# Patient Record
Sex: MALE | Race: WHITE | Employment: FULL TIME | URBAN - METROPOLITAN AREA
[De-identification: names, ages, dates, MRNs, and addresses within clinical notes are randomized per-mention and may not be internally consistent; named-entity substitution may affect disease eponyms.]

---

## 2009-08-11 LAB
CHOLESTEROL, TOTAL: 229 MG/DL
CHOLESTEROL/HDL RATIO: NORMAL
HDLC SERPL-MCNC: 42 MG/DL (ref 35–70)
LDL CHOLESTEROL CALCULATED: 143 MG/DL (ref 0–160)
TRIGL SERPL-MCNC: 219 MG/DL
VLDLC SERPL CALC-MCNC: 44 MG/DL

## 2017-03-31 LAB
BUN BLDV-MCNC: 14 MG/DL
CALCIUM SERPL-MCNC: 10.3 MG/DL
CHLORIDE BLD-SCNC: 101 MMOL/L
CO2: 26 MMOL/L
CREAT SERPL-MCNC: 1 MG/DL
GFR CALCULATED: NORMAL
GLUCOSE BLD-MCNC: 84 MG/DL
POTASSIUM SERPL-SCNC: 4.4 MMOL/L
SODIUM BLD-SCNC: 142 MMOL/L

## 2019-04-24 DIAGNOSIS — N52.9 IMPOTENCE OF ORGANIC ORIGIN: ICD-10-CM

## 2019-04-24 DIAGNOSIS — E78.5 HYPERLIPIDEMIA, UNSPECIFIED HYPERLIPIDEMIA TYPE: ICD-10-CM

## 2019-04-24 RX ORDER — LISINOPRIL AND HYDROCHLOROTHIAZIDE 12.5; 1 MG/1; MG/1
1 TABLET ORAL DAILY
COMMUNITY
End: 2019-05-17 | Stop reason: SDUPTHER

## 2019-04-24 RX ORDER — TADALAFIL 20 MG/1
20 TABLET ORAL PRN
COMMUNITY
End: 2019-05-17

## 2019-05-17 ENCOUNTER — OFFICE VISIT (OUTPATIENT)
Dept: FAMILY MEDICINE CLINIC | Age: 59
End: 2019-05-17
Payer: COMMERCIAL

## 2019-05-17 VITALS
DIASTOLIC BLOOD PRESSURE: 80 MMHG | HEIGHT: 70 IN | SYSTOLIC BLOOD PRESSURE: 130 MMHG | BODY MASS INDEX: 31.47 KG/M2 | HEART RATE: 76 BPM | WEIGHT: 219.8 LBS

## 2019-05-17 DIAGNOSIS — Z13.220 SCREENING FOR HYPERLIPIDEMIA: ICD-10-CM

## 2019-05-17 DIAGNOSIS — Z12.11 SCREENING FOR COLON CANCER: ICD-10-CM

## 2019-05-17 DIAGNOSIS — N52.9 IMPOTENCE OF ORGANIC ORIGIN: ICD-10-CM

## 2019-05-17 DIAGNOSIS — E78.5 HYPERLIPIDEMIA, UNSPECIFIED HYPERLIPIDEMIA TYPE: Primary | ICD-10-CM

## 2019-05-17 DIAGNOSIS — I10 ESSENTIAL HYPERTENSION: ICD-10-CM

## 2019-05-17 DIAGNOSIS — Z23 NEED FOR PROPHYLACTIC VACCINATION AGAINST DIPHTHERIA-TETANUS-PERTUSSIS (DTP): ICD-10-CM

## 2019-05-17 PROCEDURE — 99213 OFFICE O/P EST LOW 20 MIN: CPT | Performed by: FAMILY MEDICINE

## 2019-05-17 RX ORDER — LISINOPRIL AND HYDROCHLOROTHIAZIDE 12.5; 1 MG/1; MG/1
1 TABLET ORAL DAILY
Qty: 30 TABLET | Refills: 5 | Status: SHIPPED | OUTPATIENT
Start: 2019-05-17 | End: 2019-11-21 | Stop reason: SDUPTHER

## 2019-05-17 RX ORDER — TADALAFIL 20 MG/1
20 TABLET ORAL DAILY PRN
Qty: 10 TABLET | Refills: 5 | Status: SHIPPED | OUTPATIENT
Start: 2019-05-17 | End: 2019-11-21 | Stop reason: SDUPTHER

## 2019-05-17 ASSESSMENT — PATIENT HEALTH QUESTIONNAIRE - PHQ9
1. LITTLE INTEREST OR PLEASURE IN DOING THINGS: 0
SUM OF ALL RESPONSES TO PHQ QUESTIONS 1-9: 0
SUM OF ALL RESPONSES TO PHQ QUESTIONS 1-9: 0
SUM OF ALL RESPONSES TO PHQ9 QUESTIONS 1 & 2: 0
2. FEELING DOWN, DEPRESSED OR HOPELESS: 0

## 2019-05-17 ASSESSMENT — ENCOUNTER SYMPTOMS
SHORTNESS OF BREATH: 0
CHEST TIGHTNESS: 0
ABDOMINAL PAIN: 0
WHEEZING: 0
COUGH: 0
RESPIRATORY NEGATIVE: 1

## 2019-05-17 NOTE — PROGRESS NOTES
5/17/2019     Hussein Nolen is a 64 y.o. male who presents today with   Chief Complaint   Patient presents with    Medication Refill     ([facility])    6 Month Follow-Up     0 COMPLAINTS       HPI    Here for follow-up of multiple medical problems including hypertension. He needs updated lipid profile and blood sugar in the chemistry panel. He considers general health. He has not yet been screened for colon cancer and opts for colon regard. He due for Td At the vaccine with back ordered today. 20.  Get shingrix vaccine off-site  . He suffers erectile dysfunction and I talked to her about looking for generic Cialis      REVIEW OF SYMPTOMS    Review of Systems   Constitutional: Negative. Negative for activity change, appetite change and unexpected weight change. Respiratory: Negative. Negative for cough, chest tightness, shortness of breath and wheezing. Cardiovascular: Negative. Negative for chest pain and leg swelling. Gastrointestinal: Negative for abdominal pain. Genitourinary:        Erectile dysfunction   Psychiatric/Behavioral: Negative.         PAST MEDICAL HISTORY  Past Medical History:   Diagnosis Date    Hyperlipidemia     Impotence of organic origin        FAMILY HISTORY  Family History   Problem Relation Age of Onset    Other Mother        SOCIAL HISTORY  Social History     Socioeconomic History    Marital status:      Spouse name: None    Number of children: None    Years of education: None    Highest education level: None   Occupational History    None   Social Needs    Financial resource strain: None    Food insecurity:     Worry: None     Inability: None    Transportation needs:     Medical: None     Non-medical: None   Tobacco Use    Smoking status: Never Smoker    Smokeless tobacco: Never Used   Substance and Sexual Activity    Alcohol use: Yes     Comment: SOCIAL    Drug use: Never    Sexual activity: None   Lifestyle    Physical activity: Days per week: None     Minutes per session: None    Stress: None   Relationships    Social connections:     Talks on phone: None     Gets together: None     Attends Christianity service: None     Active member of club or organization: None     Attends meetings of clubs or organizations: None     Relationship status: None    Intimate partner violence:     Fear of current or ex partner: None     Emotionally abused: None     Physically abused: None     Forced sexual activity: None   Other Topics Concern    None   Social History Narrative    None        SURGICAL HISTORY  History reviewed. No pertinent surgical history. CURRENT MEDICATIONS  Current Outpatient Medications   Medication Sig Dispense Refill    lisinopril-hydrochlorothiazide (PRINZIDE;ZESTORETIC) 10-12.5 MG per tablet Take 1 tablet by mouth daily 30 tablet 5    tadalafil (CIALIS) 20 MG tablet Take 1 tablet by mouth daily as needed for Erectile Dysfunction 10 tablet 5     No current facility-administered medications for this visit. ALLERGIES  No Known Allergies    PHYSICAL EXAM    /80 (Site: Left Upper Arm, Position: Sitting, Cuff Size: Medium Adult)   Pulse 76   Ht 5' 10\" (1.778 m)   Wt 219 lb 12.8 oz (99.7 kg)   BMI 31.54 kg/m²     Physical Exam   Constitutional: He is oriented to person, place, and time. He appears well-developed and well-nourished. HENT:   Nose: Nose normal.   Mouth/Throat: Oropharynx is clear and moist.   Eyes: Conjunctivae are normal.   Neck: Neck supple. No thyromegaly present. Cardiovascular: Normal rate, regular rhythm, normal heart sounds and intact distal pulses. Pulmonary/Chest: Effort normal and breath sounds normal. No respiratory distress. He has no wheezes. He has no rales. Abdominal: Soft. There is no tenderness. Musculoskeletal: He exhibits no edema. Lymphadenopathy:     He has no cervical adenopathy. Neurological: He is alert and oriented to person, place, and time.    Skin: Skin is warm and dry. Psychiatric: He has a normal mood and affect. Nursing note and vitals reviewed. ASSESSMENT:    Latonia Smith was seen today for medication refill and 6 month follow-up. Diagnoses and all orders for this visit:    Hyperlipidemia, unspecified hyperlipidemia type    Essential hypertension  -     Cancel: Basic Metabolic Panel; Future  -     Comprehensive Metabolic Panel; Future  -     Lipid Panel; Future    Screening for colon cancer  -     COLOGUARD; Future    Screening for hyperlipidemia    Need for prophylactic vaccination against diphtheria-tetanus-pertussis (DTP)    Impotence of organic origin    Other orders  -     lisinopril-hydrochlorothiazide (PRINZIDE;ZESTORETIC) 10-12.5 MG per tablet; Take 1 tablet by mouth daily  -     Zoster recombinant (SHINGRIX)  -     tadalafil (CIALIS) 20 MG tablet; Take 1 tablet by mouth daily as needed for Erectile Dysfunction     Medically stable. Needs updated immunizations and lab in colon cancer screening       PLAN:  Colon regard. Get a chemistry, lipid profile. I gave him a written prescription for generic Cialis. Follow-up in 6 months          Return in about 6 months (around 11/17/2019). Electronically signed by Priscilla Bales MD on 5/17/2019    Please note that this chart was generated using dragon dictation software. Although every effort was made to ensure the accuracy of this automated transcription, some errors in transcription may have occurred.

## 2019-06-08 ENCOUNTER — HOSPITAL ENCOUNTER (OUTPATIENT)
Age: 59
Discharge: HOME | End: 2019-06-08
Payer: COMMERCIAL

## 2019-06-08 DIAGNOSIS — I10 ESSENTIAL HYPERTENSION: ICD-10-CM

## 2019-06-08 LAB
ALBUMIN SERPL-MCNC: 4.3 GM/DL (ref 3.4–5)
ALP BLD-CCNC: 60 IU/L (ref 40–128)
ALT SERPL-CCNC: 31 U/L (ref 10–40)
ANION GAP SERPL CALCULATED.3IONS-SCNC: 11 MMOL/L (ref 4–16)
AST SERPL-CCNC: 18 IU/L (ref 15–37)
BILIRUB SERPL-MCNC: 0.4 MG/DL (ref 0–1)
BUN BLDV-MCNC: 17 MG/DL (ref 6–23)
CALCIUM SERPL-MCNC: 9.3 MG/DL (ref 8.3–10.6)
CHLORIDE BLD-SCNC: 103 MMOL/L (ref 99–110)
CHOLESTEROL: 214 MG/DL
CO2: 25 MMOL/L (ref 21–32)
CREAT SERPL-MCNC: 1.1 MG/DL (ref 0.9–1.3)
GFR AFRICAN AMERICAN: >60 ML/MIN/1.73M2
GFR NON-AFRICAN AMERICAN: >60 ML/MIN/1.73M2
GLUCOSE BLD-MCNC: 97 MG/DL (ref 70–99)
HDLC SERPL-MCNC: 41 MG/DL
LDL CHOLESTEROL DIRECT: 174 MG/DL
POTASSIUM SERPL-SCNC: 4 MMOL/L (ref 3.5–5.1)
SODIUM BLD-SCNC: 139 MMOL/L (ref 135–145)
TOTAL PROTEIN: 7 GM/DL (ref 6.4–8.2)
TRIGL SERPL-MCNC: 132 MG/DL

## 2019-06-08 PROCEDURE — 83721 ASSAY OF BLOOD LIPOPROTEIN: CPT

## 2019-06-08 PROCEDURE — 36415 COLL VENOUS BLD VENIPUNCTURE: CPT

## 2019-06-08 PROCEDURE — 80053 COMPREHEN METABOLIC PANEL: CPT

## 2019-06-08 PROCEDURE — 80061 LIPID PANEL: CPT

## 2019-11-21 ENCOUNTER — OFFICE VISIT (OUTPATIENT)
Dept: FAMILY MEDICINE CLINIC | Age: 59
End: 2019-11-21
Payer: COMMERCIAL

## 2019-11-21 VITALS
HEIGHT: 70 IN | DIASTOLIC BLOOD PRESSURE: 88 MMHG | BODY MASS INDEX: 32.67 KG/M2 | HEART RATE: 73 BPM | SYSTOLIC BLOOD PRESSURE: 120 MMHG | WEIGHT: 228.2 LBS

## 2019-11-21 DIAGNOSIS — N52.9 IMPOTENCE OF ORGANIC ORIGIN: ICD-10-CM

## 2019-11-21 DIAGNOSIS — E78.2 MIXED HYPERLIPIDEMIA: ICD-10-CM

## 2019-11-21 DIAGNOSIS — I10 ESSENTIAL HYPERTENSION: ICD-10-CM

## 2019-11-21 DIAGNOSIS — Z23 NEED FOR PROPHYLACTIC VACCINATION AGAINST DIPHTHERIA-TETANUS-PERTUSSIS (DTP): ICD-10-CM

## 2019-11-21 DIAGNOSIS — Z23 NEED FOR PROPHYLACTIC VACCINATION AND INOCULATION AGAINST VARICELLA: ICD-10-CM

## 2019-11-21 DIAGNOSIS — Z23 NEEDS FLU SHOT: Primary | ICD-10-CM

## 2019-11-21 DIAGNOSIS — Z12.11 SCREENING FOR COLON CANCER: ICD-10-CM

## 2019-11-21 PROCEDURE — 99213 OFFICE O/P EST LOW 20 MIN: CPT | Performed by: FAMILY MEDICINE

## 2019-11-21 RX ORDER — ATORVASTATIN CALCIUM 40 MG/1
40 TABLET, FILM COATED ORAL DAILY
Qty: 90 TABLET | Refills: 1 | Status: SHIPPED | OUTPATIENT
Start: 2019-11-21 | End: 2020-05-21 | Stop reason: SDUPTHER

## 2019-11-21 RX ORDER — LISINOPRIL AND HYDROCHLOROTHIAZIDE 12.5; 1 MG/1; MG/1
1 TABLET ORAL DAILY
Qty: 30 TABLET | Refills: 5 | Status: SHIPPED | OUTPATIENT
Start: 2019-11-21 | End: 2020-05-21 | Stop reason: SDUPTHER

## 2019-11-21 RX ORDER — TADALAFIL 20 MG/1
20 TABLET ORAL DAILY PRN
Qty: 30 TABLET | Refills: 5 | Status: SHIPPED | OUTPATIENT
Start: 2019-11-21 | End: 2019-11-21 | Stop reason: SDUPTHER

## 2019-11-21 RX ORDER — TADALAFIL 20 MG/1
20 TABLET ORAL DAILY PRN
Qty: 30 TABLET | Refills: 5 | Status: SHIPPED | OUTPATIENT
Start: 2019-11-21 | End: 2020-05-21 | Stop reason: SDUPTHER

## 2019-11-21 ASSESSMENT — ENCOUNTER SYMPTOMS
COUGH: 0
ABDOMINAL PAIN: 0
WHEEZING: 0
RESPIRATORY NEGATIVE: 1
CHEST TIGHTNESS: 0
SHORTNESS OF BREATH: 0

## 2020-05-21 ENCOUNTER — TELEMEDICINE (OUTPATIENT)
Dept: FAMILY MEDICINE CLINIC | Age: 60
End: 2020-05-21
Payer: COMMERCIAL

## 2020-05-21 VITALS
SYSTOLIC BLOOD PRESSURE: 122 MMHG | HEART RATE: 85 BPM | WEIGHT: 228 LBS | HEIGHT: 70 IN | DIASTOLIC BLOOD PRESSURE: 82 MMHG | BODY MASS INDEX: 32.64 KG/M2 | TEMPERATURE: 97.4 F

## 2020-05-21 PROCEDURE — 99213 OFFICE O/P EST LOW 20 MIN: CPT | Performed by: FAMILY MEDICINE

## 2020-05-21 RX ORDER — ATORVASTATIN CALCIUM 40 MG/1
40 TABLET, FILM COATED ORAL DAILY
Qty: 90 TABLET | Refills: 1 | Status: SHIPPED | OUTPATIENT
Start: 2020-05-21 | End: 2020-11-23 | Stop reason: SDUPTHER

## 2020-05-21 RX ORDER — LISINOPRIL AND HYDROCHLOROTHIAZIDE 12.5; 1 MG/1; MG/1
1 TABLET ORAL DAILY
Qty: 90 TABLET | Refills: 1 | Status: SHIPPED | OUTPATIENT
Start: 2020-05-21 | End: 2020-11-23 | Stop reason: SDUPTHER

## 2020-05-21 RX ORDER — TADALAFIL 20 MG/1
20 TABLET ORAL DAILY PRN
Qty: 30 TABLET | Refills: 5 | Status: SHIPPED | OUTPATIENT
Start: 2020-05-21 | End: 2020-11-23 | Stop reason: SDUPTHER

## 2020-05-21 ASSESSMENT — PATIENT HEALTH QUESTIONNAIRE - PHQ9
SUM OF ALL RESPONSES TO PHQ9 QUESTIONS 1 & 2: 0
1. LITTLE INTEREST OR PLEASURE IN DOING THINGS: 0
2. FEELING DOWN, DEPRESSED OR HOPELESS: 0
SUM OF ALL RESPONSES TO PHQ QUESTIONS 1-9: 0
SUM OF ALL RESPONSES TO PHQ QUESTIONS 1-9: 0

## 2020-05-21 ASSESSMENT — ENCOUNTER SYMPTOMS
SORE THROAT: 0
SHORTNESS OF BREATH: 0
COUGH: 0
RHINORRHEA: 0

## 2020-05-21 NOTE — PROGRESS NOTES
2020    TELEHEALTH EVALUATION -- Audio/Visual (During INKVX-98 public health emergency)    HPI:    Cheryl Stevens (:  1960) has requested an audio/video evaluation for the following concern(s):  Initial visit done today for routine follow-up of his usual problems    Review of Systems   Constitutional: Positive for activity change. Negative for fever and unexpected weight change. Less active due to Covid 19 restrictions   HENT: Negative for congestion, rhinorrhea and sore throat. Respiratory: Negative for cough and shortness of breath. Cardiovascular: Negative for chest pain. Gastrointestinal:        The patient has no GI complaints  I had referred him for Cologuard testing he said he got the kit and send it back to the lab but we never got the report I could not find it in the scanned to media or in his lab file  Regard to contact Cologuard to get the report   Genitourinary:        Takes generic Cialis as needed   Neurological: Negative. Psychiatric/Behavioral: Negative. Prior to Visit Medications    Medication Sig Taking?  Authorizing Provider   lisinopril-hydroCHLOROthiazide (PRINZIDE;ZESTORETIC) 10-12.5 MG per tablet Take 1 tablet by mouth daily Yes Keena Posey MD   atorvastatin (LIPITOR) 40 MG tablet Take 1 tablet by mouth daily Yes Keena Posey MD   tadalafil (CIALIS) 20 MG tablet Take 1 tablet by mouth daily as needed for Erectile Dysfunction Yes Keena Posey MD       Social History     Tobacco Use    Smoking status: Never Smoker    Smokeless tobacco: Never Used   Substance Use Topics    Alcohol use: Yes     Comment: SOCIAL    Drug use: Never            PHYSICAL EXAMINATION:  [ INSTRUCTIONS:  \"[x]\" Indicates a positive item  \"[]\" Indicates a negative item  -- DELETE ALL ITEMS NOT EXAMINED]  Vital Signs: (As obtained by patient/caregiver or practitioner observation)  Wt 228  Blood pressure-122/82  Heart rate-85   Respiratory rate-    Temperature- Pulse oximetry-     Constitutional: [x] Appears well-developed and well-nourished [x] No apparent distress      [] Abnormal-   Mental status  [x] Alert and awake  [x] Oriented to person/place/time [x]Able to follow commands      Eyes:  EOM    []  Normal  [] Abnormal-  Sclera  []  Normal  [] Abnormal -         Discharge []  None visible  [] Abnormal -    HENT:   [x] Normocephalic, atraumatic. [] Abnormal   [] Mouth/Throat: Mucous membranes are moist.     External Ears [x] Normal  [] Abnormal-     Neck: [x] No visualized mass     Pulmonary/Chest: [x] Respiratory effort normal.  [x] No visualized signs of difficulty breathing or respiratory distress        [] Abnormal-      Musculoskeletal:   [] Normal gait with no signs of ataxia         [] Normal range of motion of neck        [] Abnormal-       Neurological:        [x] No Facial Asymmetry (Cranial nerve 7 motor function) (limited exam to video visit)          [] No gaze palsy        [] Abnormal-         Skin:        [x] No significant exanthematous lesions or discoloration noted on facial skin         [] Abnormal-            Psychiatric:       [x] Normal Affect [] No Hallucinations        [] Abnormal-     Other pertinent observable physical exam findings-   Viewed his immunization status and his most recent labs ASSESSMENT/PLAN:  1. Essential hypertension  Same treatment    2. Hyperlipidemia, unspecified hyperlipidemia type  Same treatment    3. Impotence of organic origin  Cialis PRN    We will contact Sonia to check on his report    Talia Kovacs is a 64 y.o. male being evaluated by a Virtual Visit (video visit) encounter to address concerns as mentioned above. A caregiver was present when appropriate. Due to this being a TeleHealth encounter (During NDSKX-63 public health emergency), evaluation of the following organ systems was limited: Vitals/Constitutional/EENT/Resp/CV/GI//MS/Neuro/Skin/Heme-Lymph-Imm.   Pursuant to the emergency declaration under the

## 2020-09-29 ENCOUNTER — TELEPHONE (OUTPATIENT)
Dept: FAMILY MEDICINE CLINIC | Age: 60
End: 2020-09-29

## 2020-09-29 NOTE — TELEPHONE ENCOUNTER
Dx with covid last week and still running a temp coughing so much he is gaging and has dry heaves. Sinus drainage making him cough so much!!     Can you call in something for the cough and drainage? ?     Please let him know

## 2020-10-02 ENCOUNTER — TELEPHONE (OUTPATIENT)
Dept: FAMILY MEDICINE CLINIC | Age: 60
End: 2020-10-02

## 2020-10-02 NOTE — TELEPHONE ENCOUNTER
Typically are having pt's use OTC treatments. He can use Mucinex DM which would help with calming his cough, breaking up the mucous and provide a bit of decongestant. Just have him monitor his blood pressure, as this type of medication can raise it a little.

## 2020-10-02 NOTE — TELEPHONE ENCOUNTER
To Dr. Kimberley Mcbride     Patient tested positive for COVID--wants to know if anything can be sent in for the ongoing coughing.

## 2020-10-05 RX ORDER — DEXTROMETHORPHAN HYDROBROMIDE, GUAIFENESIN AND PSEUDOEPHEDRINE HYDROCHLORIDE 15; 400; 60 MG/1; MG/1; MG/1
1 TABLET ORAL 4 TIMES DAILY PRN
Qty: 56 TABLET | Refills: 1 | Status: SHIPPED | OUTPATIENT
Start: 2020-10-05 | End: 2020-11-23

## 2020-11-23 ENCOUNTER — TELEMEDICINE (OUTPATIENT)
Dept: FAMILY MEDICINE CLINIC | Age: 60
End: 2020-11-23
Payer: COMMERCIAL

## 2020-11-23 PROCEDURE — 99214 OFFICE O/P EST MOD 30 MIN: CPT | Performed by: FAMILY MEDICINE

## 2020-11-23 RX ORDER — ATORVASTATIN CALCIUM 40 MG/1
40 TABLET, FILM COATED ORAL DAILY
Qty: 30 TABLET | Refills: 5 | Status: SHIPPED | OUTPATIENT
Start: 2020-11-23 | End: 2021-05-25 | Stop reason: SDUPTHER

## 2020-11-23 RX ORDER — TADALAFIL 20 MG/1
20 TABLET ORAL DAILY PRN
Qty: 30 TABLET | Refills: 5 | Status: SHIPPED | OUTPATIENT
Start: 2020-11-23 | End: 2021-12-13 | Stop reason: ALTCHOICE

## 2020-11-23 RX ORDER — LISINOPRIL AND HYDROCHLOROTHIAZIDE 12.5; 1 MG/1; MG/1
1 TABLET ORAL DAILY
Qty: 30 TABLET | Refills: 5 | Status: SHIPPED | OUTPATIENT
Start: 2020-11-23 | End: 2021-05-25 | Stop reason: SDUPTHER

## 2020-11-23 ASSESSMENT — ENCOUNTER SYMPTOMS
SHORTNESS OF BREATH: 0
SORE THROAT: 0
COUGH: 0

## 2020-11-23 NOTE — PROGRESS NOTES
well-nourished [x] No apparent distress      [] Abnormal-   Mental status  [x] Alert and awake  [x] Oriented to person/place/time [x]Able to follow commands      Eyes:  EOM    []  Normal  [] Abnormal-  Sclera  []  Normal  [] Abnormal -         Discharge []  None visible  [] Abnormal -    HENT:   [x] Normocephalic, atraumatic. [] Abnormal   [] Mouth/Throat: Mucous membranes are moist.     External Ears [x] Normal  [] Abnormal-     Neck: [x] No visualized mass     Pulmonary/Chest: [x] Respiratory effort normal.  [x] No visualized signs of difficulty breathing or respiratory distress        [] Abnormal-      Musculoskeletal:   [] Normal gait with no signs of ataxia         [] Normal range of motion of neck        [] Abnormal-       Neurological:        [x] No Facial Asymmetry (Cranial nerve 7 motor function) (limited exam to video visit)          [] No gaze palsy        [] Abnormal-         Skin:        [x] No significant exanthematous lesions or discoloration noted on facial skin         [] Abnormal-            Psychiatric:       [x] Normal Affect [] No Hallucinations        [] Abnormal-     Other pertinent observable physical exam findings-     ASSESSMENT/PLAN:  1. Impotence of organic origin      2. Hyperlipidemia, unspecified hyperlipidemia type      3. Essential hypertension    Recent COVID-19 infection completely resolved    Return in about 6 months (around 5/23/2021). Zita Leventhal is a 64 y.o. male being evaluated by a Virtual Visit (video visit) encounter to address concerns as mentioned above. A caregiver was present when appropriate. Due to this being a TeleHealth encounter (During PKWWT-46 public health emergency), evaluation of the following organ systems was limited: Vitals/Constitutional/EENT/Resp/CV/GI//MS/Neuro/Skin/Heme-Lymph-Imm.   Pursuant to the emergency declaration under the [facility] [place], [address] waiver authority and the [employer] Supplemental Appropriations Act, this Virtual Visit was conducted with patient's (and/or legal guardian's) consent, to reduce the patient's risk of exposure to COVID-19 and provide necessary medical care. The patient (and/or legal guardian) has also been advised to contact this office for worsening conditions or problems, and seek emergency medical treatment and/or call 911 if deemed necessary. Patient identification was verified at the start of the visit: Yes    Total time spent on this encounter: Not billed by time    Services were provided through a video synchronous discussion virtually to substitute for in-person clinic visit. Patient and provider were located at their individual homes. --Demetris Cuba MD on 11/23/2020 at 5:31 PM    An electronic signature was used to authenticate this note.

## 2021-05-25 ENCOUNTER — TELEMEDICINE (OUTPATIENT)
Dept: FAMILY MEDICINE CLINIC | Age: 61
End: 2021-05-25
Payer: COMMERCIAL

## 2021-05-25 DIAGNOSIS — E78.5 HYPERLIPIDEMIA, UNSPECIFIED HYPERLIPIDEMIA TYPE: ICD-10-CM

## 2021-05-25 DIAGNOSIS — N52.9 IMPOTENCE OF ORGANIC ORIGIN: ICD-10-CM

## 2021-05-25 DIAGNOSIS — I10 ESSENTIAL HYPERTENSION: Primary | ICD-10-CM

## 2021-05-25 PROCEDURE — 99213 OFFICE O/P EST LOW 20 MIN: CPT | Performed by: FAMILY MEDICINE

## 2021-05-25 RX ORDER — ATORVASTATIN CALCIUM 40 MG/1
40 TABLET, FILM COATED ORAL DAILY
Qty: 30 TABLET | Refills: 5 | Status: SHIPPED | OUTPATIENT
Start: 2021-05-25 | End: 2021-12-03 | Stop reason: SDUPTHER

## 2021-05-25 RX ORDER — LISINOPRIL AND HYDROCHLOROTHIAZIDE 12.5; 1 MG/1; MG/1
1 TABLET ORAL DAILY
Qty: 30 TABLET | Refills: 5 | Status: SHIPPED | OUTPATIENT
Start: 2021-05-25 | End: 2021-12-03 | Stop reason: SDUPTHER

## 2021-05-25 ASSESSMENT — PATIENT HEALTH QUESTIONNAIRE - PHQ9
SUM OF ALL RESPONSES TO PHQ QUESTIONS 1-9: 0
SUM OF ALL RESPONSES TO PHQ QUESTIONS 1-9: 0
SUM OF ALL RESPONSES TO PHQ9 QUESTIONS 1 & 2: 0

## 2021-05-25 NOTE — PROGRESS NOTES
2021    TELEHEALTH EVALUATION -- Audio/Visual (During MCJII-40 public health emergency)    HPI:    Elise Nevarez (:  1960) has requested an audio/video evaluation for the following concern(s):    Virtual follow-up  Patient doing well he said no change in his condition he is tolerating his current medicines    Review of Systems   Constitutional: Negative. Cardiovascular:        Hypertension hyperlipidemia well controlled on medication and diet no history or symptoms of heart disease   Gastrointestinal:        Completed and negative negative Cologuard about a year ago   Genitourinary:        As needed Viagra for erectile dysfunction       Prior to Visit Medications    Medication Sig Taking?  Authorizing Provider   atorvastatin (LIPITOR) 40 MG tablet Take 1 tablet by mouth daily Yes Maribel Daugherty MD   lisinopril-hydroCHLOROthiazide (PRINZIDE;ZESTORETIC) 10-12.5 MG per tablet Take 1 tablet by mouth daily Yes Maribel Daugherty MD   tadalafil (CIALIS) 20 MG tablet Take 1 tablet by mouth daily as needed for Erectile Dysfunction Yes Maribel Daugherty MD       Social History     Tobacco Use    Smoking status: Never Smoker    Smokeless tobacco: Never Used   Substance Use Topics    Alcohol use: Yes     Comment: SOCIAL    Drug use: Never            PHYSICAL EXAMINATION:  [ INSTRUCTIONS:  \"[x]\" Indicates a positive item  \"[]\" Indicates a negative item  -- DELETE ALL ITEMS NOT EXAMINED]  Vital Signs: (As obtained by patient/caregiver or practitioner observation)    Blood pressure-  Heart rate-    Respiratory rate-    Temperature-  Pulse oximetry-     Constitutional: [x] Appears well-developed and well-nourished [] No apparent distress      [] Abnormal-   Mental status  [x] Alert and awake  [] Oriented to person/place/time []Able to follow commands      Eyes:  EOM    []  Normal  [] Abnormal-  Sclera  []  Normal  [] Abnormal -         Discharge []  None visible  [] Abnormal -    HENT:   [] Normocephalic, atraumatic. [] Abnormal   [] Mouth/Throat: Mucous membranes are moist.     External Ears [] Normal  [] Abnormal-     Neck: [] No visualized mass     Pulmonary/Chest: [x] Respiratory effort normal.  [] No visualized signs of difficulty breathing or respiratory distress        [] Abnormal-      Musculoskeletal:   [] Normal gait with no signs of ataxia         [] Normal range of motion of neck        [] Abnormal-       Neurological:        [] No Facial Asymmetry (Cranial nerve 7 motor function) (limited exam to video visit)          [] No gaze palsy        [] Abnormal-         Skin:        [] No significant exanthematous lesions or discoloration noted on facial skin         [] Abnormal-            Psychiatric:       [] Normal Affect [] No Hallucinations        [] Abnormal-     Other pertinent observable physical exam findings-   Reviewed immunizations  Has had Covid vaccination  Reviewed most recent labs  Reviewed Cologuard    ASSESSMENT/PLAN:  1. Essential hypertension      2. Hyperlipidemia, unspecified hyperlipidemia type      3. Impotence of organic origin    Same treatments  Come in the next week get a fasting chemistry lipid profile  Follow-up in 6 months  Return in about 6 months (around 11/25/2021). Casie Jim, was evaluated through a synchronous (real-time) audio-video encounter. The patient (or guardian if applicable) is aware that this is a billable service. Verbal consent to proceed has been obtained within the past 12 months. The visit was conducted pursuant to the emergency declaration under the [facility], [address] [place] [address] authority and the [employer] General Act. Patient identification was verified, and a caregiver was present when appropriate. The patient was located in a state where the provider was credentialed to provide care.     Total time spent on this encounter: Not billed by time    --Jason Dior MD on 5/25/2021 at 4:09 PM    An electronic signature was used to authenticate this note.

## 2021-12-03 RX ORDER — ATORVASTATIN CALCIUM 40 MG/1
40 TABLET, FILM COATED ORAL DAILY
Qty: 30 TABLET | Refills: 5 | Status: SHIPPED | OUTPATIENT
Start: 2021-12-03 | End: 2022-06-09 | Stop reason: SDUPTHER

## 2021-12-03 RX ORDER — LISINOPRIL AND HYDROCHLOROTHIAZIDE 12.5; 1 MG/1; MG/1
1 TABLET ORAL DAILY
Qty: 30 TABLET | Refills: 5 | Status: SHIPPED | OUTPATIENT
Start: 2021-12-03 | End: 2022-06-09 | Stop reason: SDUPTHER

## 2021-12-13 ENCOUNTER — OFFICE VISIT (OUTPATIENT)
Dept: FAMILY MEDICINE CLINIC | Age: 61
End: 2021-12-13
Payer: COMMERCIAL

## 2021-12-13 VITALS
HEART RATE: 92 BPM | HEIGHT: 70 IN | OXYGEN SATURATION: 96 % | WEIGHT: 235 LBS | DIASTOLIC BLOOD PRESSURE: 84 MMHG | SYSTOLIC BLOOD PRESSURE: 118 MMHG | BODY MASS INDEX: 33.64 KG/M2

## 2021-12-13 DIAGNOSIS — E78.5 HYPERLIPIDEMIA, UNSPECIFIED HYPERLIPIDEMIA TYPE: Primary | ICD-10-CM

## 2021-12-13 DIAGNOSIS — R58 ECCHYMOSIS: Chronic | ICD-10-CM

## 2021-12-13 DIAGNOSIS — I10 ESSENTIAL HYPERTENSION: ICD-10-CM

## 2021-12-13 DIAGNOSIS — E78.5 HYPERLIPIDEMIA, UNSPECIFIED HYPERLIPIDEMIA TYPE: ICD-10-CM

## 2021-12-13 LAB
A/G RATIO: 1.7 (ref 1.1–2.2)
ALBUMIN SERPL-MCNC: 4.7 G/DL (ref 3.4–5)
ALP BLD-CCNC: 82 U/L (ref 40–129)
ALT SERPL-CCNC: 30 U/L (ref 10–40)
ANION GAP SERPL CALCULATED.3IONS-SCNC: 15 MMOL/L (ref 3–16)
AST SERPL-CCNC: 24 U/L (ref 15–37)
BASOPHILS ABSOLUTE: 0 K/UL (ref 0–0.2)
BASOPHILS RELATIVE PERCENT: 0.1 %
BILIRUB SERPL-MCNC: 0.6 MG/DL (ref 0–1)
BUN BLDV-MCNC: 12 MG/DL (ref 7–20)
CALCIUM SERPL-MCNC: 9.2 MG/DL (ref 8.3–10.6)
CHLORIDE BLD-SCNC: 99 MMOL/L (ref 99–110)
CHOLESTEROL, TOTAL: 175 MG/DL (ref 0–199)
CO2: 23 MMOL/L (ref 21–32)
CREAT SERPL-MCNC: 1 MG/DL (ref 0.9–1.3)
EOSINOPHILS ABSOLUTE: 0 K/UL (ref 0–0.6)
EOSINOPHILS RELATIVE PERCENT: 0.3 %
GFR AFRICAN AMERICAN: >60
GFR NON-AFRICAN AMERICAN: >60
GLUCOSE BLD-MCNC: 95 MG/DL (ref 70–99)
HCT VFR BLD CALC: 42 % (ref 40.5–52.5)
HDLC SERPL-MCNC: 33 MG/DL (ref 40–60)
HEMATOLOGY PATH CONSULT: YES
HEMOGLOBIN: 14.5 G/DL (ref 13.5–17.5)
INR BLD: 1.02 (ref 0.88–1.12)
LDL CHOLESTEROL CALCULATED: 104 MG/DL
LYMPHOCYTES ABSOLUTE: 0.7 K/UL (ref 1–5.1)
LYMPHOCYTES RELATIVE PERCENT: 41.8 %
MCH RBC QN AUTO: 27.9 PG (ref 26–34)
MCHC RBC AUTO-ENTMCNC: 34.4 G/DL (ref 31–36)
MCV RBC AUTO: 81 FL (ref 80–100)
MONOCYTES ABSOLUTE: 0.4 K/UL (ref 0–1.3)
MONOCYTES RELATIVE PERCENT: 23.6 %
NEUTROPHILS ABSOLUTE: 0.5 K/UL (ref 1.7–7.7)
NEUTROPHILS RELATIVE PERCENT: 34.2 %
PDW BLD-RTO: 13.9 % (ref 12.4–15.4)
PLATELET # BLD: 13 K/UL (ref 135–450)
PLATELET SLIDE REVIEW: ABNORMAL
PMV BLD AUTO: 10.1 FL (ref 5–10.5)
POTASSIUM SERPL-SCNC: 3.7 MMOL/L (ref 3.5–5.1)
PROTHROMBIN TIME: 11.5 SEC (ref 9.9–12.7)
RBC # BLD: 5.19 M/UL (ref 4.2–5.9)
SLIDE REVIEW: ABNORMAL
SODIUM BLD-SCNC: 137 MMOL/L (ref 136–145)
TOTAL PROTEIN: 7.4 G/DL (ref 6.4–8.2)
TRIGL SERPL-MCNC: 190 MG/DL (ref 0–150)
VLDLC SERPL CALC-MCNC: 38 MG/DL
WBC # BLD: 1.6 K/UL (ref 4–11)

## 2021-12-13 PROCEDURE — 99213 OFFICE O/P EST LOW 20 MIN: CPT | Performed by: FAMILY MEDICINE

## 2021-12-13 NOTE — PROGRESS NOTES
2021     Diana Prima      Chief Complaint   Patient presents with    6 Month Follow-Up    Bleeding/Bruising     pt reports bruising and small cuts, pt reports difficulty getting the bleeding to stop, pt reports concerns for anemia, noticed about x1 week after covid vaccines       HPI      Nara Kirby is a 63 y.o. male who presents today with the followin. Hyperlipidemia, unspecified hyperlipidemia type    2. Ecchymosis    3.  Essential hypertension      Is here for 6-month follow-up  He is concerned about increased bruising tendency and has had a couple nosebleeds  He is worried about having a blood disorder he dates this back to when he had Covid immunizations about 6 months ago  REVIEW OF SYMPTOMS    Review of Systems   Cardiovascular:        Hypertension and hyperlipidemia well-controlled   Gastrointestinal:        Did a Cologuard last year which was normal will be due again in 2 years   Genitourinary:        Discussed pros and cons of prostate screening with PSA the patient declines screening       PAST MEDICAL HISTORY  Past Medical History:   Diagnosis Date    Hyperlipidemia     Impotence of organic origin        FAMILY HISTORY  Family History   Problem Relation Age of Onset    Other Mother        SOCIAL HISTORY  Social History     Socioeconomic History    Marital status:      Spouse name: None    Number of children: None    Years of education: None    Highest education level: None   Occupational History    None   Tobacco Use    Smoking status: Never Smoker    Smokeless tobacco: Never Used   Substance and Sexual Activity    Alcohol use: Yes     Comment: SOCIAL    Drug use: Never    Sexual activity: None   Other Topics Concern    None   Social History Narrative    None     Social Determinants of Health     Financial Resource Strain:     Difficulty of Paying Living Expenses: Not on file   Food Insecurity:     Worried About Running Out of Food in the Last Year: Not on file    Ran Out of Food in the Last Year: Not on file   Transportation Needs:     Lack of Transportation (Medical): Not on file    Lack of Transportation (Non-Medical): Not on file   Physical Activity:     Days of Exercise per Week: Not on file    Minutes of Exercise per Session: Not on file   Stress:     Feeling of Stress : Not on file   Social Connections:     Frequency of Communication with Friends and Family: Not on file    Frequency of Social Gatherings with Friends and Family: Not on file    Attends Yazidi Services: Not on file    Active Member of [address] [place] [address] Goalbook or Organizations: Not on file    Attends Club or Organization Meetings: Not on file    Marital Status: Not on file   Intimate Partner Violence:     Fear of Current or Ex-Partner: Not on file    Emotionally Abused: Not on file    Physically Abused: Not on file    Sexually Abused: Not on file   Housing Stability:     Unable to Pay for Housing in the Last Year: Not on file    Number of [place] in the Last Year: Not on file    Unstable Housing in the Last Year: Not on file        SURGICAL HISTORY  No past surgical history on file. CURRENT MEDICATIONS  Current Outpatient Medications   Medication Sig Dispense Refill    lisinopril-hydroCHLOROthiazide (PRINZIDE;ZESTORETIC) 10-12.5 MG per tablet Take 1 tablet by mouth daily 30 tablet 5    atorvastatin (LIPITOR) 40 MG tablet Take 1 tablet by mouth daily 30 tablet 5     No current facility-administered medications for this visit. ALLERGIES  No Known Allergies    PHYSICAL EXAM    /84   Pulse 92   Ht 5' 10\" (1.778 m)   Wt 235 lb (106.6 kg)   SpO2 96%   BMI 33.72 kg/m²     Physical Exam  Vitals and nursing note reviewed. Constitutional:       Appearance: Normal appearance. Cardiovascular:      Rate and Rhythm: Normal rate.    Pulmonary:      Effort: Pulmonary effort is normal.     Reviewed immunizations  Reviewed most recent labs        ASSESSMENT and PLAN    Arnulfo Mace was seen today for 6 month follow-up and bleeding/bruising. Diagnoses and all orders for this visit:    Hyperlipidemia, unspecified hyperlipidemia type  -     Comprehensive Metabolic Panel; Future  -     Lipid Panel; Future    Ecchymosis  -     CBC Auto Differential; Future  -     Protime-INR; Future    Essential hypertension    Check labs including a pro time and a CBC  Check lipids and chemistry profile  Follow-up in 6 months  Follow-up by phone regarding his test results    Return in about 6 months (around 6/13/2022). Electronically signed by Dori Wolfe MD on 12/13/2021    Please note that this chart was generated using dragon dictation software. Although every effort was made to ensure the accuracy of this automated transcription, some errors in transcription may have occurred.

## 2021-12-14 ENCOUNTER — TELEPHONE (OUTPATIENT)
Dept: FAMILY MEDICINE CLINIC | Age: 61
End: 2021-12-14

## 2021-12-14 DIAGNOSIS — D69.6 THROMBOCYTOPENIA (HCC): Primary | ICD-10-CM

## 2021-12-14 LAB — HEMATOLOGY PATH CONSULT: NORMAL

## 2022-01-18 NOTE — PROGRESS NOTES
Patient Name:  Paul Randhawa  Patient :  1960  Patient MRN:  [id number]     Primary Oncologist: Daniel Dahl MD  Referring Provider: King Ely MD     Date of Service: 2022      Reason for Consult: Leukopenia and thrombocytopenia     Chief Complaint:  No chief complaint on file. Patient Active Problem List:     Hyperlipidemia     Impotence of organic origin     Essential hypertension     Ecchymosis       HPI:   Lennox Dingwall is a pleasant 81-year-old male patient who was referred for evaluation of leukopenia and thrombocytopenia. In 2015 WBC was 9.93, hemoglobin 14.1, platelet 452. On 2021 WBC was 1.6, hemoglobin 14.5, hematocrit 42, MCV 81, platelet 13. CMP was grossly unremarkable. Peripheral blood smear and histogram are reviewed.  Leukopenia is  present with absolute neutropenia.  Granulocytes display occasional hypogranularity.  Definitive circulating blasts are not identified. There is no evidence of anemia and red blood cells display normal morphology.  Platelets are decreased without evidence of clumping. Clinical correlation is recommended.  If no etiology is identified for the patient's cytopenias, hematology consultation should be considered with possible bone marrow biopsy to exclude myeloid disorder/neoplasm. He denied any recent infection. He denies any epistaxis or gums bleed. He also denied any excessive bleeding. He had vaccine for COVID-19 in April and May 2021. After the vaccine he had quite often easy bruising. Also he reported that he had blood in the tissue when he blow his nose intermittently after the vaccine. He is aware about his heart murmur which he has for more than 10 years. I discussed with him the work-up further leukopenia/thrombocytopenia. I doubt that he has TTP.   I will order CBC with manual differential, flow cytometry, work-up for thrombocytopenia including ultrasound of the abdomen to rule out hepatosplenomegaly. We also discussed about potential bone marrow study depending on the test result. I reviewed his medication list.  He denied taking any herbal medication. No tonic water. He drinks 4-5 beers weekly. Cologuard in 2021 was negative. Past Medical History:   Hyperlipidemia   Hypertension  Impotence of organic origin      Past Surgery History:    No history of colonoscopy. Denied any surgical procedure. Social History:   He denied any smoking history. He drinks 4-5 beers weekly. He has 2 children. Family History:   No malignancy in the family. No Known Allergies    Current Outpatient Medications on File Prior to Visit   Medication Sig Dispense Refill    lisinopril-hydroCHLOROthiazide (PRINZIDE;ZESTORETIC) 10-12.5 MG per tablet Take 1 tablet by mouth daily 30 tablet 5    atorvastatin (LIPITOR) 40 MG tablet Take 1 tablet by mouth daily 30 tablet 5     No current facility-administered medications on file prior to visit. Review of Systems:    Constitutional:  No weight loss, No fever, No chills, No night sweats. Energy level good. Eyes:  No diplopia, No transient or permanent loss of vision, No scotomata. ENT / Mouth:  No epistaxis, No dysphagia, No hoarseness, No oral ulcers, No gingival bleeding. No sore throat, No postnasal drip, No nasal drip, No mouth pain, No sinus pain, No tinnitus, Normal hearing. Cardiovascular:  No chest pain, No palpitations, No syncope, No upper extremity edema, No lower extremity edema, No calf discomfort. Respiratory:  No cough. No hemoptysis, No pleurisy, No wheezing, No dyspnea. Gastrointestinal:  No abdominal pain, No abdominal cramping, No nausea, No vomiting, No constipation, No diarrhea, No hematochezia, No melena, No jaundice, No dyspepsia, No dysphagia. Urinary:  No dysuria, No hematuria, No urinary incontinence. Musculoskeletal:  No muscle pain, No swollen joints, No joint redness, No bone pain, No spine tenderness.   Skin:  No rash, No nodules, No pruritus, No lesions. Neurologic:  No confusion, No seizures, No syncope, No tremor, No speech change, No headache, No hiccups, No abnormal gait, No sensory changes, No weakness. Psychiatric:  No depression, No anxiety, Concentration normal.  Endocrine:  No polyuria, No polydipsia, No hot flashes, No thyroid symptoms. Hematologic:  No epistaxis, No gingival bleeding, No petechiae, No ecchymosis. Lymphatic:  No lymphadenopathy, No lymphedema. Allergy / Immunologic:  No eczema, No frequent mucous infections, No frequent respiratory infections, No recurrent urticarial, No frequent skin infections. Vital Signs: There were no vitals taken for this visit. Physical Exam:  CONSTITUTIONAL: awake, alert, cooperative, no apparent distress   EYES: pupils equal, round and reactive to light, sclera clear and conjunctiva normal  ENT: Normocephalic, without obvious abnormality, atraumatic  NECK: supple, symmetrical, no jugular venous distension and no carotid bruits   HEMATOLOGIC/LYMPHATIC: no cervical, supraclavicular or axillary lymphadenopathy   LUNGS: no increased work of breathing and clear to auscultation   CARDIOVASCULAR: regular rate and rhythm, normal S1 and S2, + murmur  ABDOMEN: normal bowel sound, soft, non-distended, non-tender, no masses palpated, no hepatosplenomegaly   MUSCULOSKELETAL: full range of motion noted, tone is normal  NEUROLOGIC: awake, alert, oriented to name, place and time. Motor skills grossly intact. SKIN: Normal skin color, texture, turgor and no jaundice.  appears intact   EXTREMITIES: no LE edema     Labs:  Hematology:  Lab Results   Component Value Date    WBC 1.6 (LL) 12/13/2021    RBC 5.19 12/13/2021    HGB 14.5 12/13/2021    HCT 42.0 12/13/2021    MCV 81.0 12/13/2021    MCH 27.9 12/13/2021    MCHC 34.4 12/13/2021    RDW 13.9 12/13/2021    PLT 13 (LL) 12/13/2021    MPV 10.1 12/13/2021    EOSRELPCT 0.3 12/13/2021    BASOPCT 0.1 12/13/2021    LYMPHOPCT 41.8

## 2022-01-21 ENCOUNTER — INITIAL CONSULT (OUTPATIENT)
Dept: ONCOLOGY | Age: 62
End: 2022-01-21
Payer: COMMERCIAL

## 2022-01-21 ENCOUNTER — HOSPITAL ENCOUNTER (OUTPATIENT)
Dept: INFUSION THERAPY | Age: 62
Discharge: HOME | End: 2022-01-21
Payer: COMMERCIAL

## 2022-01-21 VITALS
BODY MASS INDEX: 33.21 KG/M2 | RESPIRATION RATE: 18 BRPM | HEART RATE: 111 BPM | HEIGHT: 70 IN | TEMPERATURE: 98.3 F | DIASTOLIC BLOOD PRESSURE: 85 MMHG | OXYGEN SATURATION: 97 % | WEIGHT: 232 LBS | SYSTOLIC BLOOD PRESSURE: 143 MMHG

## 2022-01-21 DIAGNOSIS — D69.6 THROMBOCYTOPENIA (HCC): ICD-10-CM

## 2022-01-21 DIAGNOSIS — D72.819 LEUKOPENIA, UNSPECIFIED TYPE: ICD-10-CM

## 2022-01-21 DIAGNOSIS — D69.6 THROMBOCYTOPENIA (HCC): Primary | ICD-10-CM

## 2022-01-21 LAB
ALBUMIN SERPL-MCNC: 4.3 GM/DL (ref 3.4–5)
ALP BLD-CCNC: 90 IU/L (ref 40–129)
ALT SERPL-CCNC: 28 U/L (ref 10–40)
ANION GAP SERPL CALCULATED.3IONS-SCNC: 14 MMOL/L (ref 4–16)
AST SERPL-CCNC: 20 IU/L (ref 15–37)
BASOPHILS ABSOLUTE: 0 K/CU MM
BASOPHILS RELATIVE PERCENT: 0 % (ref 0–1)
BILIRUB SERPL-MCNC: 0.4 MG/DL (ref 0–1)
BUN BLDV-MCNC: 15 MG/DL (ref 6–23)
CALCIUM SERPL-MCNC: 9 MG/DL (ref 8.3–10.6)
CHLORIDE BLD-SCNC: 101 MMOL/L (ref 99–110)
CO2: 22 MMOL/L (ref 21–32)
CREAT SERPL-MCNC: 1 MG/DL (ref 0.9–1.3)
DIFFERENTIAL TYPE: ABNORMAL
EOSINOPHILS ABSOLUTE: 0 K/CU MM
EOSINOPHILS RELATIVE PERCENT: 0 % (ref 0–3)
FOLATE: 10.8 NG/ML (ref 3.1–17.5)
GFR AFRICAN AMERICAN: >60 ML/MIN/1.73M2
GFR NON-AFRICAN AMERICAN: >60 ML/MIN/1.73M2
GLUCOSE BLD-MCNC: 122 MG/DL (ref 70–99)
HAV IGM SER IA-ACNC: NON REACTIVE
HCT VFR BLD CALC: 43.5 % (ref 42–52)
HEMOGLOBIN: 15.4 GM/DL (ref 13.5–18)
HEPATITIS B CORE IGM ANTIBODY: NON REACTIVE
HEPATITIS C ANTIBODY: NON REACTIVE
LACTATE DEHYDROGENASE: 202 IU/L (ref 120–246)
LYMPHOCYTES ABSOLUTE: 0.7 K/CU MM
LYMPHOCYTES RELATIVE PERCENT: 36.3 % (ref 24–44)
MCH RBC QN AUTO: 27.5 PG (ref 27–31)
MCHC RBC AUTO-ENTMCNC: 35.4 % (ref 32–36)
MCV RBC AUTO: 77.7 FL (ref 78–100)
MONOCYTES ABSOLUTE: 0.4 K/CU MM
MONOCYTES RELATIVE PERCENT: 20 % (ref 0–4)
PDW BLD-RTO: 13.7 % (ref 11.7–14.9)
PLATELET # BLD: 28 K/CU MM (ref 140–440)
PMV BLD AUTO: 12.3 FL (ref 7.5–11.1)
POTASSIUM SERPL-SCNC: 3.7 MMOL/L (ref 3.5–5.1)
RBC # BLD: 5.6 M/CU MM (ref 4.6–6.2)
SEGMENTED NEUTROPHILS ABSOLUTE COUNT: 0.8 K/CU MM
SEGMENTED NEUTROPHILS RELATIVE PERCENT: 43.7 % (ref 36–66)
SODIUM BLD-SCNC: 137 MMOL/L (ref 135–145)
TOTAL PROTEIN: 7.7 GM/DL (ref 6.4–8.2)
VITAMIN B-12: 1027 PG/ML (ref 211–911)
WBC # BLD: 1.9 K/CU MM (ref 4–10.5)

## 2022-01-21 PROCEDURE — 86803 HEPATITIS C AB TEST: CPT

## 2022-01-21 PROCEDURE — 86430 RHEUMATOID FACTOR TEST QUAL: CPT

## 2022-01-21 PROCEDURE — 99204 OFFICE O/P NEW MOD 45 MIN: CPT | Performed by: INTERNAL MEDICINE

## 2022-01-21 PROCEDURE — 86705 HEP B CORE ANTIBODY IGM: CPT

## 2022-01-21 PROCEDURE — 87389 HIV-1 AG W/HIV-1&-2 AB AG IA: CPT

## 2022-01-21 PROCEDURE — 85025 COMPLETE CBC W/AUTO DIFF WBC: CPT

## 2022-01-21 PROCEDURE — 82607 VITAMIN B-12: CPT

## 2022-01-21 PROCEDURE — 82746 ASSAY OF FOLIC ACID SERUM: CPT

## 2022-01-21 PROCEDURE — 36415 COLL VENOUS BLD VENIPUNCTURE: CPT

## 2022-01-21 PROCEDURE — 80053 COMPREHEN METABOLIC PANEL: CPT

## 2022-01-21 PROCEDURE — 86707 HEPATITIS BE ANTIBODY: CPT

## 2022-01-21 PROCEDURE — 87350 HEPATITIS BE AG IA: CPT

## 2022-01-21 PROCEDURE — 83615 LACTATE (LD) (LDH) ENZYME: CPT

## 2022-01-21 PROCEDURE — 86038 ANTINUCLEAR ANTIBODIES: CPT

## 2022-01-21 PROCEDURE — 86709 HEPATITIS A IGM ANTIBODY: CPT

## 2022-01-21 RX ORDER — LORAZEPAM 0.5 MG/1
0.5 TABLET ORAL DAILY PRN
Qty: 2 TABLET | Refills: 0 | Status: SHIPPED | OUTPATIENT
Start: 2022-01-21 | End: 2022-02-07

## 2022-01-21 ASSESSMENT — PATIENT HEALTH QUESTIONNAIRE - PHQ9
2. FEELING DOWN, DEPRESSED OR HOPELESS: 0
SUM OF ALL RESPONSES TO PHQ9 QUESTIONS 1 & 2: 0
1. LITTLE INTEREST OR PLEASURE IN DOING THINGS: 0
SUM OF ALL RESPONSES TO PHQ QUESTIONS 1-9: 0

## 2022-01-21 NOTE — PROGRESS NOTES
MA Rooming Questions  Patient: Kayla Drake  MRN: [id number]    Date: 1/21/2022        NP      5. Did the patient have a depression screening completed today?  Yes    PHQ-9 Total Score: 0 (1/21/2022  2:55 PM)       PHQ-9 Given to (if applicable):               PHQ-9 Score (if applicable):                     [] Positive     [x]  Negative              Does question #9 need addressed (if applicable)                     [] Yes    []  No               Rowdy Fabian MA

## 2022-01-22 LAB — HIV SCREEN: NON REACTIVE

## 2022-01-23 LAB — HEPATITIS BE ANTIBODY: NEGATIVE

## 2022-01-24 LAB
ANTI-NUCLEAR ANTIBODY (ANA): NORMAL
RHEUMATOID FACTOR: <10 IU/ML (ref 0–14)

## 2022-01-25 ENCOUNTER — PROCEDURE VISIT (OUTPATIENT)
Dept: ONCOLOGY | Age: 62
End: 2022-01-25
Payer: COMMERCIAL

## 2022-01-25 ENCOUNTER — HOSPITAL ENCOUNTER (OUTPATIENT)
Dept: INFUSION THERAPY | Age: 62
Discharge: HOME | End: 2022-01-25
Payer: COMMERCIAL

## 2022-01-25 VITALS
SYSTOLIC BLOOD PRESSURE: 133 MMHG | HEIGHT: 70 IN | TEMPERATURE: 98.1 F | OXYGEN SATURATION: 99 % | DIASTOLIC BLOOD PRESSURE: 83 MMHG | WEIGHT: 236 LBS | HEART RATE: 90 BPM | BODY MASS INDEX: 33.79 KG/M2 | RESPIRATION RATE: 18 BRPM

## 2022-01-25 DIAGNOSIS — D72.819 LEUKOPENIA, UNSPECIFIED TYPE: Primary | ICD-10-CM

## 2022-01-25 DIAGNOSIS — D72.819 LEUKOPENIA, UNSPECIFIED TYPE: ICD-10-CM

## 2022-01-25 LAB
BASOPHILS ABSOLUTE: 0 K/CU MM
BASOPHILS RELATIVE PERCENT: 0.5 % (ref 0–1)
DIFFERENTIAL TYPE: ABNORMAL
EOSINOPHILS ABSOLUTE: 0 K/CU MM
EOSINOPHILS RELATIVE PERCENT: 0 % (ref 0–3)
HCT VFR BLD CALC: 40.9 % (ref 42–52)
HEMOGLOBIN: 14.3 GM/DL (ref 13.5–18)
HEPATITIS BE ANTIGEN: NEGATIVE
LYMPHOCYTES ABSOLUTE: 0.8 K/CU MM
LYMPHOCYTES RELATIVE PERCENT: 42.1 % (ref 24–44)
MCH RBC QN AUTO: 27.9 PG (ref 27–31)
MCHC RBC AUTO-ENTMCNC: 35 % (ref 32–36)
MCV RBC AUTO: 79.9 FL (ref 78–100)
MONOCYTES ABSOLUTE: 0.4 K/CU MM
MONOCYTES RELATIVE PERCENT: 22.1 % (ref 0–4)
PDW BLD-RTO: 13.6 % (ref 11.7–14.9)
PLATELET # BLD: 28 K/CU MM (ref 140–440)
PMV BLD AUTO: 12.6 FL (ref 7.5–11.1)
RBC # BLD: 5.12 M/CU MM (ref 4.6–6.2)
RETICULOCYTE COUNT PCT: 1.8 % (ref 0.2–2.2)
SEGMENTED NEUTROPHILS ABSOLUTE COUNT: 0.7 K/CU MM
SEGMENTED NEUTROPHILS RELATIVE PERCENT: 35.3 % (ref 36–66)
WBC # BLD: 2 K/CU MM (ref 4–10.5)

## 2022-01-25 PROCEDURE — 38220 DX BONE MARROW ASPIRATIONS: CPT | Performed by: INTERNAL MEDICINE

## 2022-01-25 PROCEDURE — 88311 DECALCIFY TISSUE: CPT | Performed by: PATHOLOGY

## 2022-01-25 PROCEDURE — 85045 AUTOMATED RETICULOCYTE COUNT: CPT

## 2022-01-25 PROCEDURE — 88313 SPECIAL STAINS GROUP 2: CPT | Performed by: PATHOLOGY

## 2022-01-25 PROCEDURE — 85025 COMPLETE CBC W/AUTO DIFF WBC: CPT

## 2022-01-25 PROCEDURE — 36415 COLL VENOUS BLD VENIPUNCTURE: CPT

## 2022-01-25 PROCEDURE — 88305 TISSUE EXAM BY PATHOLOGIST: CPT | Performed by: PATHOLOGY

## 2022-01-25 PROCEDURE — 88305 TISSUE EXAM BY PATHOLOGIST: CPT

## 2022-01-25 NOTE — PROGRESS NOTES
Marly Ferrera  1960  [id number]      Primary Oncologist: Kristin Sanchez MD  Referring Provider: Dr Ignacio Shafer    1/25/2022     BONE MARROW BIOPSY & ASPIRATION    Consent:  Marly Ferrera voiced an understanding of the risks/benefits and the answers to their questions, then proceeded to sign and date the consent. A written consent was obtained from the patient and placed into the chart. Consent includes:  1. Explanation of the potential risks and benefits of this procedure, alternative methods of treatment, and risks despite precautions. 2. An understanding of the current diagnosis to make treatment decisions. 3. Administration of anesthetics as deemed advisable for the procedure. 4. An understanding that tissues and products may be removed and sent to pathology or disposed of by an appropriate source per practice standard. A time out was performed verifying the patient name, date of birth, procedure and location of the procedure. Procedure - BM Asp and Bx     Indication: cytopenia     Anesthesia:   5 ml 1 % Lidocaine                        0.5 mg Lorazepam     Patient position: prone     Bone Marrow Needle: yes     Procedure Steps: The patient was placed in the prone position. The area was prepped and draped in a sterile fashion. Using an \"I\" needle a bone marrow aspirate was obtained from the right posterior iliac crest. Using the scapel provided a puncture was made over the right posteriour iliac crest. Then using anJamshidi needle a bone marrow biopsy was obtained. A sterile bandage was applied. No significant bleeding. Sample sent for histology, flow cytometry, and cytogenetics. In addition sample was sent for MDS     The patient is given the usual after care instructions. The patient will keep the area clearn and dry for 24 hours. Appropriate pain control instructions were discussed. The patient knows to call [phone] with any signs of bleeding, infection, or other difficulties. Jesse Watson MD

## 2022-01-25 NOTE — PROGRESS NOTES
MA Bone Marrow Rooming Questions  Patient: Addis Turk  MRN: [id number]    Date: 1/25/2022        Time out procedure was completed prior to Bone Marrow Procedures. Patient verbalized understanding of post op instructions and copy of instructions sent home with patient. Patient was discharged home. 1. Do you have any new issues?   no         2. Do you need any refills on medications?    no    3. Have you had any imaging done since your last visit?   no    4. Have you been hospitalized or seen in the emergency room since your last visit here?   no    5. Did the patient have a depression screening completed today?  No    No data recorded     PHQ-9 Given to (if applicable):               PHQ-9 Score (if applicable):                     [] Positive     []  Negative              Does question #9 need addressed (if applicable)                     [] Yes    []  No               Christo Moreno MA

## 2022-01-26 NOTE — PROGRESS NOTES
dexaPatient Name:  Grzegorz Holland  Patient :  1960  Patient MRN:  [id number]     Primary Oncologist: Yannick Beard MD  Referring Provider: Jese Evans MD     Date of Service: 2022     Chief Complaint:    Chief Complaint   Patient presents with   [address]     He came in for follow-up visit. Patient Active Problem List:     Hyperlipidemia     Impotence of organic origin     Essential hypertension     Ecchymosis     HPI:   Reba Gray is a pleasant 77-year-old male patient who was referred for evaluation of leukopenia and thrombocytopenia. In 2015 WBC was 9.93, hemoglobin 14.1, platelet 787. On 2021 WBC was 1.6, hemoglobin 14.5, hematocrit 42, MCV 81, platelet 13. CMP was grossly unremarkable. Peripheral blood smear and histogram are reviewed.  Leukopenia is  present with absolute neutropenia.  Granulocytes display occasional hypogranularity.  Definitive circulating blasts are not identified. There is no evidence of anemia and red blood cells display normal morphology.  Platelets are decreased without evidence of clumping. Clinical correlation is recommended.  If no etiology is identified for the patient's cytopenias, hematology consultation should be considered with possible bone marrow biopsy to exclude myeloid disorder/neoplasm. He denied any recent infection. He denies any epistaxis or gums bleed. He also denied any excessive bleeding. He had vaccine for COVID-19 in April and May 2021. After the vaccine he had quite often easy bruising. Also he reported that he had blood in the tissue when he blow his nose intermittently after the vaccine. He is aware about his heart murmur which he has for more than 10 years. I discussed with him the work-up further leukopenia/thrombocytopenia. I doubt that he has TTP.   I will order CBC with manual differential, flow cytometry, work-up for thrombocytopenia including ultrasound of the abdomen to rule out cytometry: No diagnostic immunophenotypic abnormalities detected. FISH:   Chromosome 8: Not detected   Chromosome 20: Not detected   5q-/-5/+5 tricolor: Not detected   7q-/-7 tri: Not detected   CBFB (16q22): Not detected   KMT2A (MLL) (11q23)*: Not detected   PML/ORALIA t(15;17): Not detected   RUNX1/ABLI8S5 (ETO/AML1) t(8;21): Not detected     Peripheral blood smear:   CBC: WBC: 2.0 k/cumm, Hb: 14.3 g/dL, MCV: 79.9 fL, RDW:   13.6%, platelets: 28 k/cumm   Differential: Neutrophils: 35.3%, lymphocytes: 42.1%,   monocytes: 22.1%, basophils: 0.5%   RBCs: Normocytic normochromic   WBCs: Leukopenia, some larger lymphocytes are seen   Platelets: Decreased with some large platelets seen     US of abdomen 1/31/2022: The spleen appears mildly enlarged measuring 13 cm in length.   Increased echogenicty of the liver parenchyma, which may reflect underlying hepatic steatosis or other chronic liver parenchymal disease. I recommend to cut back on his alcohol. CBC on February 7, 2022 showed WBC 2.2, hemoglobin 14.3, platelet 23 with MPV 12. I will give a trial of pulse dexamethasone. I will have her repeat CBC on Friday. Clinically he could have autoimmune thrombocytopenic purpura. He denied any easy bruising. No petechial rash. No acute pain. Denied any nausea, vomiting or diarrhea. No fever or chills. No chest pain, shortness of breath or palpitation. No headache or dizzy spell. No specific bone pain. No melena or hematochezia. Denied any dysuria or hematuria. Past Medical History:   Hyperlipidemia   Hypertension  Impotence of organic origin      Past Surgery History:    No history of colonoscopy. Denied any surgical procedure. Social History:   He denied any smoking history. He drinks 4-5 beers weekly. He has 2 children. Family History:   No malignancy in the family. Review of Systems: The remainder of the review of system is unremarkable.      Vital Signs: BP (!) 150/86 (Site: Right Upper Arm, Position: Sitting, Cuff Size: Medium Adult)   Pulse 95   Temp 98.6 °F (37 °C) (Infrared)   Resp 16   Ht 5' 10\" (1.778 m)   Wt 236 lb (107 kg)   SpO2 98%   BMI 33.86 kg/m²      Physical Exam:  CONSTITUTIONAL: awake, alert, cooperative, no apparent distress   EYES: pupils equal, round and reactive to light, sclera clear and conjunctiva normal  ENT: Normocephalic, without obvious abnormality, atraumatic  NECK: supple, symmetrical, no jugular venous distension and no carotid bruits   HEMATOLOGIC/LYMPHATIC: no cervical, supraclavicular or axillary lymphadenopathy   LUNGS: no increased work of breathing and clear to auscultation   CARDIOVASCULAR: regular rate and rhythm, normal S1 and S2, + murmur  ABDOMEN: normal bowel sound, soft, non-distended, non-tender, no masses palpated, no hepatosplenomegaly   MUSCULOSKELETAL: full range of motion noted, tone is normal  NEUROLOGIC: Motor skills grossly intact. CN II - XII grossly intact. SKIN: No jaundice. appears intact. No petechiae   EXTREMITIES: no LE edema or cyanosis.     Labs:  Hematology:  Lab Results   Component Value Date    WBC 2.5 (L) 02/07/2022    RBC 5.15 02/07/2022    HGB 14.3 02/07/2022    HCT 41.0 (L) 02/07/2022    MCV 79.6 02/07/2022    MCH 27.8 02/07/2022    MCHC 34.9 02/07/2022    RDW 13.8 02/07/2022    PLT 23 (L) 02/07/2022    MPV 12.0 (H) 02/07/2022    SEGSPCT 49.4 02/07/2022    EOSRELPCT 0.0 02/07/2022    BASOPCT 0.4 02/07/2022    LYMPHOPCT 34.3 02/07/2022    MONOPCT 15.9 (H) 02/07/2022    SEGSABS 1.2 02/07/2022    EOSABS 0.0 02/07/2022    BASOSABS 0.0 02/07/2022    LYMPHSABS 0.8 02/07/2022    MONOSABS 0.4 02/07/2022    DIFFTYPE AUTOMATED DIFFERENTIAL 02/07/2022     Chemistry:  Lab Results   Component Value Date     02/07/2022    K 4.0 02/07/2022     02/07/2022    CO2 25 02/07/2022    BUN 12 02/07/2022    CREATININE 0.9 02/07/2022    GLUCOSE 123 (H) 02/07/2022    CALCIUM 9.7 02/07/2022    PROT 7.9 02/07/2022    LABALBU 4.5 02/07/2022    BILITOT 0.4 02/07/2022    ALKPHOS 76 02/07/2022    AST 24 02/07/2022    ALT 28 02/07/2022    LABGLOM >60 02/07/2022    GFRAA >60 02/07/2022    AGRATIO 1.7 12/13/2021     Immunology:  Lab Results   Component Value Date    PROT 7.9 02/07/2022     Coagulation Panel:  Lab Results   Component Value Date    PROTIME 11.5 12/13/2021    INR 1.02 12/13/2021      Observations:  PHQ-9 Total Score: 0 (2/7/2022  2:58 PM)       Assessment & Plan:  1. He was referred for evaluation of leukopenia and thrombocytopenia. I will order work-up for leukopenia and thrombocytopenia. CBC on on January 21, 2022 showed WBC 1.9, hemoglobin 15.4, MCV 77.7, platelet 28. Absolute neutrophil was 0.8. Bone marrow in January 2022 was reviewed. He could have ITP. I will put him on pulse dexamethasone and have her repeat CBC on Friday. 2.  Ultrasound of abdomen showed fatty liver. I recommend to cut back on his alcohol. He also has mild splenomegaly. 3. I recommend to keep age-appropriate cancer screening up-to-date. Return to clinic in 6 weeks or sooner. All of his question has been answered for today. I have discussed the above stated plan with the patient and they verbalized understanding and agreed with the plan.

## 2022-01-27 LAB — COMMENT: NORMAL

## 2022-01-31 ENCOUNTER — HOSPITAL ENCOUNTER (OUTPATIENT)
Dept: ULTRASOUND IMAGING | Age: 62
Discharge: HOME | End: 2022-01-31
Payer: COMMERCIAL

## 2022-01-31 DIAGNOSIS — D69.6 THROMBOCYTOPENIA (HCC): ICD-10-CM

## 2022-01-31 PROCEDURE — 76705 ECHO EXAM OF ABDOMEN: CPT

## 2022-02-07 ENCOUNTER — OFFICE VISIT (OUTPATIENT)
Dept: ONCOLOGY | Age: 62
End: 2022-02-07
Payer: COMMERCIAL

## 2022-02-07 ENCOUNTER — HOSPITAL ENCOUNTER (OUTPATIENT)
Dept: INFUSION THERAPY | Age: 62
Discharge: HOME | End: 2022-02-07
Payer: COMMERCIAL

## 2022-02-07 VITALS
DIASTOLIC BLOOD PRESSURE: 86 MMHG | WEIGHT: 236 LBS | HEART RATE: 95 BPM | RESPIRATION RATE: 16 BRPM | BODY MASS INDEX: 33.79 KG/M2 | HEIGHT: 70 IN | OXYGEN SATURATION: 98 % | SYSTOLIC BLOOD PRESSURE: 150 MMHG | TEMPERATURE: 98.6 F

## 2022-02-07 DIAGNOSIS — D69.6 THROMBOCYTOPENIA (HCC): ICD-10-CM

## 2022-02-07 DIAGNOSIS — D69.6 THROMBOCYTOPENIA (HCC): Primary | ICD-10-CM

## 2022-02-07 LAB
ALBUMIN SERPL-MCNC: 4.5 GM/DL (ref 3.4–5)
ALP BLD-CCNC: 76 IU/L (ref 40–128)
ALT SERPL-CCNC: 28 U/L (ref 10–40)
ANION GAP SERPL CALCULATED.3IONS-SCNC: 10 MMOL/L (ref 4–16)
AST SERPL-CCNC: 24 IU/L (ref 15–37)
BASOPHILS ABSOLUTE: 0 K/CU MM
BASOPHILS RELATIVE PERCENT: 0.4 % (ref 0–1)
BILIRUB SERPL-MCNC: 0.4 MG/DL (ref 0–1)
BUN BLDV-MCNC: 12 MG/DL (ref 6–23)
CALCIUM SERPL-MCNC: 9.7 MG/DL (ref 8.3–10.6)
CHLORIDE BLD-SCNC: 100 MMOL/L (ref 99–110)
CO2: 25 MMOL/L (ref 21–32)
CREAT SERPL-MCNC: 0.9 MG/DL (ref 0.9–1.3)
DIFFERENTIAL TYPE: ABNORMAL
EOSINOPHILS ABSOLUTE: 0 K/CU MM
EOSINOPHILS RELATIVE PERCENT: 0 % (ref 0–3)
GFR AFRICAN AMERICAN: >60 ML/MIN/1.73M2
GFR NON-AFRICAN AMERICAN: >60 ML/MIN/1.73M2
GLUCOSE BLD-MCNC: 123 MG/DL (ref 70–99)
HCT VFR BLD CALC: 41 % (ref 42–52)
HEMOGLOBIN: 14.3 GM/DL (ref 13.5–18)
LYMPHOCYTES ABSOLUTE: 0.8 K/CU MM
LYMPHOCYTES RELATIVE PERCENT: 34.3 % (ref 24–44)
MCH RBC QN AUTO: 27.8 PG (ref 27–31)
MCHC RBC AUTO-ENTMCNC: 34.9 % (ref 32–36)
MCV RBC AUTO: 79.6 FL (ref 78–100)
MONOCYTES ABSOLUTE: 0.4 K/CU MM
MONOCYTES RELATIVE PERCENT: 15.9 % (ref 0–4)
PDW BLD-RTO: 13.8 % (ref 11.7–14.9)
PLATELET # BLD: 23 K/CU MM (ref 140–440)
PMV BLD AUTO: 12 FL (ref 7.5–11.1)
POTASSIUM SERPL-SCNC: 4 MMOL/L (ref 3.5–5.1)
RBC # BLD: 5.15 M/CU MM (ref 4.6–6.2)
SEGMENTED NEUTROPHILS ABSOLUTE COUNT: 1.2 K/CU MM
SEGMENTED NEUTROPHILS RELATIVE PERCENT: 49.4 % (ref 36–66)
SODIUM BLD-SCNC: 135 MMOL/L (ref 135–145)
TOTAL PROTEIN: 7.9 GM/DL (ref 6.4–8.2)
WBC # BLD: 2.5 K/CU MM (ref 4–10.5)

## 2022-02-07 PROCEDURE — 85025 COMPLETE CBC W/AUTO DIFF WBC: CPT

## 2022-02-07 PROCEDURE — 36415 COLL VENOUS BLD VENIPUNCTURE: CPT

## 2022-02-07 PROCEDURE — 99213 OFFICE O/P EST LOW 20 MIN: CPT | Performed by: INTERNAL MEDICINE

## 2022-02-07 PROCEDURE — 80053 COMPREHEN METABOLIC PANEL: CPT

## 2022-02-07 RX ORDER — DEXAMETHASONE 4 MG/1
4 TABLET ORAL
Qty: 20 TABLET | Refills: 0 | Status: SHIPPED | OUTPATIENT
Start: 2022-02-07 | End: 2022-06-15 | Stop reason: ALTCHOICE

## 2022-02-07 ASSESSMENT — PATIENT HEALTH QUESTIONNAIRE - PHQ9
SUM OF ALL RESPONSES TO PHQ QUESTIONS 1-9: 0
SUM OF ALL RESPONSES TO PHQ9 QUESTIONS 1 & 2: 0
SUM OF ALL RESPONSES TO PHQ QUESTIONS 1-9: 0
2. FEELING DOWN, DEPRESSED OR HOPELESS: 0
1. LITTLE INTEREST OR PLEASURE IN DOING THINGS: 0
SUM OF ALL RESPONSES TO PHQ QUESTIONS 1-9: 0
SUM OF ALL RESPONSES TO PHQ QUESTIONS 1-9: 0

## 2022-02-07 NOTE — PROGRESS NOTES
MA Rooming Questions  Patient: Dana Hall  MRN: [id number]    Date: 2/7/2022        1. Do you have any new issues?   no         2. Do you need any refills on medications?    no    3. Have you had any imaging done since your last visit? yes - [place]    4. Have you been hospitalized or seen in the emergency room since your last visit here?   no    5. Did the patient have a depression screening completed today?  Yes    PHQ-9 Total Score: 0 (2/7/2022  2:58 PM)       PHQ-9 Given to (if applicable):               PHQ-9 Score (if applicable):                     [] Positive     []  Negative              Does question #9 need addressed (if applicable)                     [] Yes    []  No               Jose A Tristan CMA

## 2022-02-09 ENCOUNTER — PATIENT MESSAGE (OUTPATIENT)
Dept: ONCOLOGY | Age: 62
End: 2022-02-09

## 2022-02-10 RX ORDER — METOCLOPRAMIDE 5 MG/1
5 TABLET ORAL DAILY PRN
Qty: 10 TABLET | Refills: 0 | Status: SHIPPED | OUTPATIENT
Start: 2022-02-10 | End: 2022-07-21

## 2022-02-10 NOTE — TELEPHONE ENCOUNTER
From: Klaudia Adrian  To: Dr. France Bowden  Sent: 2/9/2022 10:12 PM EST  Subject: Hiccups    On Wednesday the 9th I have had the hiccups all day. I researched and it says that the steroids I was prescribed could be causing it. Today is the second day on the steroids. Should I stop taking them?

## 2022-02-10 NOTE — TELEPHONE ENCOUNTER
Spoke with Dr. Manuel Ruiz regarding pt.'s hiccups, order received for Reglan, Called and informed pt. Of new rx. And that  Dr. Dalila Sands like him to continue taking steriod. Pt. To return to center tomorrow for lab work.

## 2022-02-11 ENCOUNTER — HOSPITAL ENCOUNTER (OUTPATIENT)
Dept: INFUSION THERAPY | Age: 62
Discharge: HOME | End: 2022-02-11
Payer: COMMERCIAL

## 2022-02-11 DIAGNOSIS — D69.6 THROMBOCYTOPENIA (HCC): ICD-10-CM

## 2022-02-11 LAB
ALBUMIN SERPL-MCNC: 4 GM/DL (ref 3.4–5)
ALP BLD-CCNC: 68 IU/L (ref 40–129)
ALT SERPL-CCNC: 25 U/L (ref 10–40)
ANION GAP SERPL CALCULATED.3IONS-SCNC: 14 MMOL/L (ref 4–16)
AST SERPL-CCNC: 19 IU/L (ref 15–37)
BASOPHILS ABSOLUTE: 0 K/CU MM
BASOPHILS RELATIVE PERCENT: 0.3 % (ref 0–1)
BILIRUB SERPL-MCNC: 0.3 MG/DL (ref 0–1)
BUN BLDV-MCNC: 17 MG/DL (ref 6–23)
CALCIUM SERPL-MCNC: 8.8 MG/DL (ref 8.3–10.6)
CHLORIDE BLD-SCNC: 99 MMOL/L (ref 99–110)
CO2: 21 MMOL/L (ref 21–32)
CREAT SERPL-MCNC: 0.9 MG/DL (ref 0.9–1.3)
DIFFERENTIAL TYPE: ABNORMAL
EOSINOPHILS ABSOLUTE: 0 K/CU MM
EOSINOPHILS RELATIVE PERCENT: 0 % (ref 0–3)
GFR AFRICAN AMERICAN: >60 ML/MIN/1.73M2
GFR NON-AFRICAN AMERICAN: >60 ML/MIN/1.73M2
GLUCOSE BLD-MCNC: 170 MG/DL (ref 70–99)
HCT VFR BLD CALC: 39.8 % (ref 42–52)
HEMOGLOBIN: 13.7 GM/DL (ref 13.5–18)
LYMPHOCYTES ABSOLUTE: 0.4 K/CU MM
LYMPHOCYTES RELATIVE PERCENT: 7.1 % (ref 24–44)
MCH RBC QN AUTO: 28.1 PG (ref 27–31)
MCHC RBC AUTO-ENTMCNC: 34.4 % (ref 32–36)
MCV RBC AUTO: 81.7 FL (ref 78–100)
MONOCYTES ABSOLUTE: 0.1 K/CU MM
MONOCYTES RELATIVE PERCENT: 2.3 % (ref 0–4)
PDW BLD-RTO: 14.1 % (ref 11.7–14.9)
PLATELET # BLD: 170 K/CU MM (ref 140–440)
PMV BLD AUTO: 10.1 FL (ref 7.5–11.1)
POTASSIUM SERPL-SCNC: 3.9 MMOL/L (ref 3.5–5.1)
RBC # BLD: 4.87 M/CU MM (ref 4.6–6.2)
SEGMENTED NEUTROPHILS ABSOLUTE COUNT: 5.5 K/CU MM
SEGMENTED NEUTROPHILS RELATIVE PERCENT: 90.3 % (ref 36–66)
SODIUM BLD-SCNC: 134 MMOL/L (ref 135–145)
TOTAL PROTEIN: 6.9 GM/DL (ref 6.4–8.2)
WBC # BLD: 6 K/CU MM (ref 4–10.5)

## 2022-02-11 PROCEDURE — 85025 COMPLETE CBC W/AUTO DIFF WBC: CPT

## 2022-02-11 PROCEDURE — 80053 COMPREHEN METABOLIC PANEL: CPT

## 2022-02-11 PROCEDURE — 36415 COLL VENOUS BLD VENIPUNCTURE: CPT

## 2022-02-21 LAB
CHROMOSOME, BONE MARROW: NORMAL
COMMENT: NORMAL
GDT REPLACEMENT: NORMAL

## 2022-03-02 NOTE — PROGRESS NOTES
dexaPatient Name:  Bandar Leyva  Patient :  1960  Patient MRN:  [id number]     Primary Oncologist: Юлия Durand MD  Referring Provider: Claus Catherine MD     Date of Service: 3/31/2022     Chief Complaint:    Chief Complaint   Patient presents with    Follow-up     He came in for follow-up visit. Patient Active Problem List:     Hyperlipidemia     Impotence of organic origin     Essential hypertension     Ecchymosis     HPI:   Alec Oropeza is a pleasant 71-year-old male patient who was referred for evaluation of leukopenia and thrombocytopenia. In 2015 WBC was 9.93, hemoglobin 14.1, platelet 046. On 2021 WBC was 1.6, hemoglobin 14.5, hematocrit 42, MCV 81, platelet 13. CMP was grossly unremarkable. Peripheral blood smear and histogram are reviewed.  Leukopenia is  present with absolute neutropenia.  Granulocytes display occasional hypogranularity.  Definitive circulating blasts are not identified. There is no evidence of anemia and red blood cells display normal morphology.  Platelets are decreased without evidence of clumping. Clinical correlation is recommended.  If no etiology is identified for the patient's cytopenias, hematology consultation should be considered with possible bone marrow biopsy to exclude myeloid disorder/neoplasm. He denied any recent infection. He denies any epistaxis or gums bleed. He also denied any excessive bleeding. He had vaccine for COVID-19 in April and May 2021. After the vaccine he had quite often easy bruising. Also he reported that he had blood in the tissue when he blow his nose intermittently after the vaccine. He is aware about his heart murmur which he has for more than 10 years. I discussed with him the work-up further leukopenia/thrombocytopenia. I doubt that he has TTP.   I will order CBC with manual differential, flow cytometry, work-up for thrombocytopenia including ultrasound of the abdomen to rule out hepatosplenomegaly. We also discussed about potential bone marrow study depending on the test result. I reviewed his medication list.  He denied taking any herbal medication. No tonic water. He drinks 4-5 beers weekly. Cologuard in 2021 was negative. I discussed with him the results of the bone marrow study on January 31, 2022. A-C. Bone marrow, biopsy, clot section, and aspirate smears: -     High normocellular marrow with trilineage hematopoiesis  Peripheral blood smear:   -     Leukopenia and thrombocytopenia. Comment   Some dyspoietic features are seen in the erythroid cells. The morphologic features seen are not sufficient alone for a definitive diagnosis of myelodysplasia. FISH testing did not reveal any abnormalities. Karyotyping and molecular testing results are pending. Microscopic description:   Bone marrow biopsy and clot section:   Adequacy: Adequate   Cellularity: ~60%   Myeloid cells: Well-represented with full maturation   Erythroid cells: Well-represented with island formation   Megakaryocytes: Slightly increased with full maturation and focal loose clustering     Bone marrow aspirate smear:   Adequacy: Adequate   Myeloid:erythroid ratio: 1.2:1   Myeloid cells: Well-represented with full maturation   Erythroid cells: Well-represented, some forms with megaloblastoid change are seen   Megakaryocytes: Well-represented with full maturation   Iron stain: stainable iron is present, ring sideroblasts are not seen     Bone marrow differential count: Blasts:               4%   Flow cytometry: No diagnostic immunophenotypic abnormalities detected. FISH:   Chromosome 8: Not detected   Chromosome 20: Not detected   5q-/-5/+5 tricolor: Not detected   7q-/-7 tri: Not detected   CBFB (16q22): Not detected   KMT2A (MLL) (11q23)*: Not detected   PML/ORALIA t(15;17):  Not detected   RUNX1/OPKG3U0 (ETO/AML1) t(8;21): Not detected     Molecular testing by next generation sequencing: No clinically significant mutations detected   Karyotype: 46,XY[20]     Peripheral blood smear:   CBC: WBC: 2.0 k/cumm, Hb: 14.3 g/dL, MCV: 79.9 fL, RDW:   13.6%, platelets: 28 k/cumm   Differential: Neutrophils: 35.3%, lymphocytes: 42.1%,   monocytes: 22.1%, basophils: 0.5%   RBCs: Normocytic normochromic   WBCs: Leukopenia, some larger lymphocytes are seen   Platelets: Decreased with some large platelets seen     US of abdomen 1/31/2022: The spleen appears mildly enlarged measuring 13 cm in length.   Increased echogenicty of the liver parenchyma, which may reflect underlying hepatic steatosis or other chronic liver parenchymal disease. I recommend to cut back on his alcohol. CBC on February 7, 2022 showed WBC 2.2, hemoglobin 14.3, platelet 23 with MPV 12. I will give a trial of pulse dexamethasone. I will have her repeat CBC on Friday. Clinically he could have autoimmune thrombocytopenic purpura. He denied any easy bruising. No petechial rash. On March 31, 2022 he came in for follow up visit. He had a trial of pulse Dex for 4 days with improvement of the platelet counts. He has hiccups from the dexamethasone. In February 2022 CMP was stable with blood sugar 170. White cell was 6.0, hemoglobin 13.7, platelet 852. He denied any petechial rash or excessive bruising. I will check CBC today. If CBC is within normal limits I will repeat in 6 months. In 6 months if CBC remains stable I will follow him as needed. No acute pain. Denied any nausea, vomiting or diarrhea. No fever or chills. No chest pain, shortness of breath or palpitation. No headache or dizzy spell. No specific bone pain. No melena or hematochezia. Denied any dysuria or hematuria. Past Medical History:   Hyperlipidemia   Hypertension  Impotence of organic origin      Past Surgery History:    No history of colonoscopy. Denied any surgical procedure. Social History:   He denied any smoking history. He drinks 4-5 beers weekly.   He has 2 children. Family History:   No malignancy in the family. Review of Systems: The remainder of the review of system is unremarkable. Vital Signs: /80 (Site: Right Upper Arm, Position: Sitting, Cuff Size: Large Adult)   Pulse 96   Temp 98.6 °F (37 °C) (Temporal)   Resp 18   Ht 5' 10\" (1.778 m)   Wt 236 lb (107 kg)   SpO2 96%   BMI 33.86 kg/m²      Physical Exam:  CONSTITUTIONAL: awake, alert, cooperative, no apparent distress   EYES: pupils equal, round and reactive to light, sclera clear and conjunctiva normal  ENT: Normocephalic, without obvious abnormality, atraumatic  NECK: supple, symmetrical, no jugular venous distension and no carotid bruits   HEMATOLOGIC/LYMPHATIC: no cervical, supraclavicular or axillary lymphadenopathy   LUNGS: no increased work of breathing and clear to auscultation   CARDIOVASCULAR: regular rate and rhythm, normal S1 and S2, + murmur  ABDOMEN: normal bowel sound, soft, non-distended, non-tender, no masses palpated, no hepatosplenomegaly   MUSCULOSKELETAL: full range of motion noted, tone is normal  NEUROLOGIC: Motor skills grossly intact. CN II - XII grossly intact. SKIN: No jaundice. appears intact. No ecchymosis or petechial rash. EXTREMITIES: no LE edema or cyanosis.     Labs:  Hematology:  Lab Results   Component Value Date    WBC 6.0 02/11/2022    RBC 4.87 02/11/2022    HGB 13.7 02/11/2022    HCT 39.8 (L) 02/11/2022    MCV 81.7 02/11/2022    MCH 28.1 02/11/2022    MCHC 34.4 02/11/2022    RDW 14.1 02/11/2022     02/11/2022    MPV 10.1 02/11/2022    SEGSPCT 90.3 (H) 02/11/2022    EOSRELPCT 0.0 02/11/2022    BASOPCT 0.3 02/11/2022    LYMPHOPCT 7.1 (L) 02/11/2022    MONOPCT 2.3 02/11/2022    SEGSABS 5.5 02/11/2022    EOSABS 0.0 02/11/2022    BASOSABS 0.0 02/11/2022    LYMPHSABS 0.4 02/11/2022    MONOSABS 0.1 02/11/2022    DIFFTYPE AUTOMATED DIFFERENTIAL 02/11/2022     Chemistry:  Lab Results   Component Value Date     (L) 02/11/2022    K 3.9 02/11/2022    CL 99 02/11/2022    CO2 21 02/11/2022    BUN 17 02/11/2022    CREATININE 0.9 02/11/2022    GLUCOSE 170 (H) 02/11/2022    CALCIUM 8.8 02/11/2022    PROT 6.9 02/11/2022    LABALBU 4.0 02/11/2022    BILITOT 0.3 02/11/2022    ALKPHOS 68 02/11/2022    AST 19 02/11/2022    ALT 25 02/11/2022    LABGLOM >60 02/11/2022    GFRAA >60 02/11/2022    AGRATIO 1.7 12/13/2021     Immunology:  Lab Results   Component Value Date    PROT 6.9 02/11/2022     Coagulation Panel:  Lab Results   Component Value Date    PROTIME 11.5 12/13/2021    INR 1.02 12/13/2021      Observations:  No data recorded     Assessment & Plan:  1. He was referred for evaluation of leukopenia and thrombocytopenia. I will order work-up for leukopenia and thrombocytopenia. CBC on on January 21, 2022 showed WBC 1.9, hemoglobin 15.4, MCV 77.7, platelet 28. Absolute neutrophil was 0.8. Bone marrow in January 2022 was reviewed. He could have ITP. He responded to pulse dexamethasone well with normalization of the platelet count. He had hiccups because of the dexamethasone. I will check CBC today. 2.  Ultrasound of abdomen showed fatty liver. I recommend to cut back on his alcohol. He also has mild splenomegaly. 3. I recommend to keep age-appropriate cancer screening up-to-date. Reportedly he has stool occult in 2021. He will follow-up with a new family doctor. Return to clinic in 3 months or sooner. All of his question has been answered for today. I have discussed the above stated plan with the patient and they verbalized understanding and agreed with the plan.

## 2022-03-31 ENCOUNTER — HOSPITAL ENCOUNTER (OUTPATIENT)
Dept: INFUSION THERAPY | Age: 62
Discharge: HOME | End: 2022-03-31
Payer: COMMERCIAL

## 2022-03-31 ENCOUNTER — OFFICE VISIT (OUTPATIENT)
Dept: ONCOLOGY | Age: 62
End: 2022-03-31
Payer: COMMERCIAL

## 2022-03-31 VITALS
DIASTOLIC BLOOD PRESSURE: 80 MMHG | SYSTOLIC BLOOD PRESSURE: 129 MMHG | BODY MASS INDEX: 33.79 KG/M2 | RESPIRATION RATE: 18 BRPM | HEART RATE: 96 BPM | OXYGEN SATURATION: 96 % | TEMPERATURE: 98.6 F | WEIGHT: 236 LBS | HEIGHT: 70 IN

## 2022-03-31 DIAGNOSIS — D69.6 THROMBOCYTOPENIA (HCC): ICD-10-CM

## 2022-03-31 DIAGNOSIS — D69.6 THROMBOCYTOPENIA (HCC): Primary | ICD-10-CM

## 2022-03-31 LAB
BASOPHILS ABSOLUTE: 0 K/CU MM
BASOPHILS RELATIVE PERCENT: 0 % (ref 0–1)
DIFFERENTIAL TYPE: ABNORMAL
EOSINOPHILS ABSOLUTE: 0 K/CU MM
EOSINOPHILS RELATIVE PERCENT: 0 % (ref 0–3)
HCT VFR BLD CALC: 42.4 % (ref 42–52)
HEMOGLOBIN: 14.7 GM/DL (ref 13.5–18)
LYMPHOCYTES ABSOLUTE: 1 K/CU MM
LYMPHOCYTES RELATIVE PERCENT: 32.9 % (ref 24–44)
MCH RBC QN AUTO: 28.2 PG (ref 27–31)
MCHC RBC AUTO-ENTMCNC: 34.7 % (ref 32–36)
MCV RBC AUTO: 81.2 FL (ref 78–100)
MONOCYTES ABSOLUTE: 0.5 K/CU MM
MONOCYTES RELATIVE PERCENT: 15.8 % (ref 0–4)
PDW BLD-RTO: 13.9 % (ref 11.7–14.9)
PLATELET # BLD: 60 K/CU MM (ref 140–440)
PMV BLD AUTO: 10.6 FL (ref 7.5–11.1)
RBC # BLD: 5.22 M/CU MM (ref 4.6–6.2)
SEGMENTED NEUTROPHILS ABSOLUTE COUNT: 1.6 K/CU MM
SEGMENTED NEUTROPHILS RELATIVE PERCENT: 51.3 % (ref 36–66)
WBC # BLD: 3.2 K/CU MM (ref 4–10.5)

## 2022-03-31 PROCEDURE — 99213 OFFICE O/P EST LOW 20 MIN: CPT | Performed by: INTERNAL MEDICINE

## 2022-03-31 PROCEDURE — 85025 COMPLETE CBC W/AUTO DIFF WBC: CPT

## 2022-03-31 PROCEDURE — 36415 COLL VENOUS BLD VENIPUNCTURE: CPT

## 2022-03-31 NOTE — PROGRESS NOTES
MA Rooming Questions  Patient: Yoli Velasco  MRN: [id number]    Date: 3/31/2022        1. Do you have any new issues?   no         2. Do you need any refills on medications?    no    3. Have you had any imaging done since your last visit?   no    4. Have you been hospitalized or seen in the emergency room since your last visit here?   no    5. Did the patient have a depression screening completed today?  No    No data recorded     PHQ-9 Given to (if applicable):               PHQ-9 Score (if applicable):                     [] Positive     []  Negative              Does question #9 need addressed (if applicable)                     [] Yes    []  No               Wesley Moeller CMA

## 2022-05-03 ENCOUNTER — HOSPITAL ENCOUNTER (OUTPATIENT)
Dept: INFUSION THERAPY | Age: 62
Discharge: HOME | End: 2022-05-03
Payer: COMMERCIAL

## 2022-05-03 DIAGNOSIS — D69.6 THROMBOCYTOPENIA (HCC): ICD-10-CM

## 2022-05-03 LAB
ALBUMIN SERPL-MCNC: 4.6 GM/DL (ref 3.4–5)
ALP BLD-CCNC: 78 IU/L (ref 40–128)
ALT SERPL-CCNC: 29 U/L (ref 10–40)
ANION GAP SERPL CALCULATED.3IONS-SCNC: 13 MMOL/L (ref 4–16)
AST SERPL-CCNC: 21 IU/L (ref 15–37)
BASOPHILS ABSOLUTE: 0 K/CU MM
BASOPHILS RELATIVE PERCENT: 0.3 % (ref 0–1)
BILIRUB SERPL-MCNC: 0.3 MG/DL (ref 0–1)
BUN BLDV-MCNC: 15 MG/DL (ref 6–23)
CALCIUM SERPL-MCNC: 9.1 MG/DL (ref 8.3–10.6)
CHLORIDE BLD-SCNC: 103 MMOL/L (ref 99–110)
CO2: 23 MMOL/L (ref 21–32)
CREAT SERPL-MCNC: 0.9 MG/DL (ref 0.9–1.3)
DIFFERENTIAL TYPE: ABNORMAL
EOSINOPHILS ABSOLUTE: 0 K/CU MM
EOSINOPHILS RELATIVE PERCENT: 0.3 % (ref 0–3)
GFR AFRICAN AMERICAN: >60 ML/MIN/1.73M2
GFR NON-AFRICAN AMERICAN: >60 ML/MIN/1.73M2
GLUCOSE BLD-MCNC: 107 MG/DL (ref 70–99)
HCT VFR BLD CALC: 41.2 % (ref 42–52)
HEMOGLOBIN: 14.1 GM/DL (ref 13.5–18)
LYMPHOCYTES ABSOLUTE: 1.1 K/CU MM
LYMPHOCYTES RELATIVE PERCENT: 34.5 % (ref 24–44)
MCH RBC QN AUTO: 28.4 PG (ref 27–31)
MCHC RBC AUTO-ENTMCNC: 34.2 % (ref 32–36)
MCV RBC AUTO: 82.9 FL (ref 78–100)
MONOCYTES ABSOLUTE: 0.5 K/CU MM
MONOCYTES RELATIVE PERCENT: 14.6 % (ref 0–4)
PDW BLD-RTO: 13.6 % (ref 11.7–14.9)
PLATELET # BLD: 69 K/CU MM (ref 140–440)
PMV BLD AUTO: 11 FL (ref 7.5–11.1)
POTASSIUM SERPL-SCNC: 3.9 MMOL/L (ref 3.5–5.1)
RBC # BLD: 4.97 M/CU MM (ref 4.6–6.2)
SEGMENTED NEUTROPHILS ABSOLUTE COUNT: 1.7 K/CU MM
SEGMENTED NEUTROPHILS RELATIVE PERCENT: 50.3 % (ref 36–66)
SODIUM BLD-SCNC: 139 MMOL/L (ref 135–145)
TOTAL PROTEIN: 7.1 GM/DL (ref 6.4–8.2)
WBC # BLD: 3.3 K/CU MM (ref 4–10.5)

## 2022-05-03 PROCEDURE — 80053 COMPREHEN METABOLIC PANEL: CPT

## 2022-05-03 PROCEDURE — 85025 COMPLETE CBC W/AUTO DIFF WBC: CPT

## 2022-05-03 PROCEDURE — 36415 COLL VENOUS BLD VENIPUNCTURE: CPT

## 2022-05-31 NOTE — PROGRESS NOTES
dexaPatient Name:  Everlena Mcardle  Patient :  1960  Patient MRN:  [id number]     Primary Oncologist: Yennifer Spear MD  Referring Provider: Calvin Huang DO     Date of Service: 2022     Chief Complaint:    Chief Complaint   Patient presents with    Follow-up     He came in for follow-up visit. Patient Active Problem List:     Hyperlipidemia     Impotence of organic origin     Essential hypertension     Ecchymosis     HPI:   Sandro Blount is a pleasant 59-year-old male patient who was referred for evaluation of leukopenia and thrombocytopenia. In 2015 WBC was 9.93, hemoglobin 14.1, platelet 161. On 2021 WBC was 1.6, hemoglobin 14.5, hematocrit 42, MCV 81, platelet 13. CMP was grossly unremarkable. Peripheral blood smear and histogram are reviewed. Leukopenia is  present with absolute neutropenia. Granulocytes display occasional hypogranularity.  Definitive circulating blasts are not identified. There is no evidence of anemia and red blood cells display normal morphology.  Platelets are decreased without evidence of clumping. Clinical correlation is recommended.  If no etiology is identified for the patient's cytopenias, hematology consultation should be considered with possible bone marrow biopsy to exclude myeloid disorder/neoplasm. He denied any recent infection. He denied any epistaxis or gums bleed. He denied any excessive bleeding. He had vaccine for COVID-19 in April and May 2021. After the vaccine he had quite often easy bruising. Also he reported that he had blood in the tissue when he blow his nose intermittently after the vaccine. He is aware about his heart murmur which he has for more than 10 years. I discussed with him the work-up further leukopenia and or thrombocytopenia. I doubted that he has TTP. I reviewed his medication list.  He denied taking any herbal medication. No tonic water. He drinks 4-5 beers weekly.   Sonia in  was negative. Bone marrow study on January 31, 2022. A-C. Bone marrow, biopsy, clot section, and aspirate smears: -     High normocellular marrow with trilineage hematopoiesis  Peripheral blood smear:   -     Leukopenia and thrombocytopenia. Comment   Some dyspoietic features are seen in the erythroid cells. The morphologic features seen are not sufficient alone for a definitive diagnosis of myelodysplasia. FISH testing did not reveal any abnormalities. Karyotyping and molecular testing results are pending. Microscopic description:   Bone marrow biopsy and clot section:   Adequacy: Adequate   Cellularity: ~60%   Myeloid cells: Well-represented with full maturation   Erythroid cells: Well-represented with island formation   Megakaryocytes: Slightly increased with full maturation and focal loose clustering     Bone marrow aspirate smear:   Adequacy: Adequate   Myeloid:erythroid ratio: 1.2:1   Myeloid cells: Well-represented with full maturation   Erythroid cells: Well-represented, some forms with megaloblastoid change are seen   Megakaryocytes: Well-represented with full maturation   Iron stain: stainable iron is present, ring sideroblasts are not seen     Bone marrow differential count: Blasts:  4%   Flow cytometry: No diagnostic immunophenotypic abnormalities detected. FISH:   Chromosome 8: Not detected   Chromosome 20: Not detected   5q-/-5/+5 tricolor: Not detected   7q-/-7 tri: Not detected   CBFB (16q22): Not detected   KMT2A (MLL) (11q23)*: Not detected   PML/ORALIA t(15;17):  Not detected   RUNX1/VBKE5O2 (ETO/AML1) t(8;21): Not detected     Molecular testing by next generation sequencing: No clinically significant mutations detected   Karyotype: 46,XY[20]     Peripheral blood smear:   CBC: WBC: 2.0 k/cumm, Hb: 14.3 g/dL, MCV: 79.9 fL, RDW:   13.6%, platelets: 28 k/cumm   Differential: Neutrophils: 35.3%, lymphocytes: 42.1%,   monocytes: 22.1%, basophils: 0.5%   RBCs: Normocytic normochromic   WBCs: Leukopenia, some larger lymphocytes are seen   Platelets: Decreased with some large platelets seen     US of abdomen 1/31/2022: The spleen appears mildly enlarged measuring 13 cm in length.   Increased echogenicty of the liver parenchyma, which may reflect underlying hepatic steatosis or other chronic liver parenchymal disease. I recommend to cut back on his alcohol consumption. CBC on February 7, 2022 showed WBC 2.2, hemoglobin 14.3, platelet 23 with MPV 12. He had a trial of pulse Dex for 4 days with improvement of the platelet counts. He has hiccups from the dexamethasone. In February 2022 CMP was stable with blood sugar 170. White cell was 6.0, hemoglobin 13.7, platelet 557. On June 30, 2022 he came in for follow up visit. In June 2022 WBC was 3.7, Hg 14.1 and plt 92. CMP was grossly unremarkable. 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 He denied excessive bruising or bleeding. He will see cardiologist for moderate aortic stenosis as per ECHO in June 2022. No acute pain. Denied any nausea, vomiting or diarrhea. No fever or chills. No chest pain, shortness of breath or palpitation. No headache or dizzy spell. No specific bone pain. No melena or hematochezia. Denied any dysuria or hematuria. Past Medical History:   Hyperlipidemia   Hypertension  Impotence of organic origin   Chronic ITP     Past Surgery History:    No history of colonoscopy. Bone marrow biopsy and aspiration. Social History:   He denied any smoking history. He drinks 4-5 beers weekly. He has 2 children. Family History:   No malignancy in the family. Review of Systems: The remainder of the review of system is unremarkable.      Vital Signs: /85 (Site: Right Upper Arm, Position: Sitting, Cuff Size: Large Adult)   Pulse 92   Temp 98.1 °F (36.7 °C) (Temporal)   Resp 18   Ht 5' 10\" (1.778 m)   Wt 236 lb 6.4 oz (107.2 kg)   SpO2 97%   BMI 33.92 kg/m²      Physical Exam:  CONSTITUTIONAL: awake, alert, cooperative, no apparent distress   EYES: pupils equal and round, sclera clear and conjunctiva normal  ENT: Normocephalic, without obvious abnormality, atraumatic  NECK: supple, symmetrical, no jugular venous distension and no carotid bruits   HEMATOLOGIC/LYMPHATIC: no cervical, supraclavicular or axillary lymphadenopathy   LUNGS: no increased work of breathing and clear to auscultation   CARDIOVASCULAR: regular rate and rhythm, normal S1 and S2, + murmur right upper parasternal border. ABDOMEN: normal bowel sound, soft, non-distended, non-tender, no masses palpated, no hepatosplenomegaly   MUSCULOSKELETAL: full range of motion noted, tone is normal  NEUROLOGIC: Motor skills grossly intact. CN II - XII grossly intact. SKIN: No jaundice. appears intact. No ecchymosis or petechial rash. EXTREMITIES: no LE edema or cyanosis.     Labs:  Hematology:  Lab Results   Component Value Date    WBC 4.1 06/30/2022    RBC 5.21 06/30/2022    HGB 14.9 06/30/2022    HCT 42.2 06/30/2022    MCV 81.0 06/30/2022    MCH 28.6 06/30/2022    MCHC 35.3 06/30/2022    RDW 13.3 06/30/2022    PLT 70 (L) 06/30/2022    MPV 10.7 06/30/2022    SEGSPCT 64.0 06/30/2022    EOSRELPCT 0.2 06/30/2022    BASOPCT 0.2 06/30/2022    LYMPHOPCT 23.0 (L) 06/30/2022    MONOPCT 12.6 (H) 06/30/2022    SEGSABS 2.6 06/30/2022    EOSABS 0.0 06/30/2022    BASOSABS 0.0 06/30/2022    LYMPHSABS 1.0 06/30/2022    MONOSABS 0.5 06/30/2022    DIFFTYPE AUTOMATED DIFFERENTIAL 06/30/2022     Chemistry:  Lab Results   Component Value Date     06/30/2022    K 3.6 06/30/2022     06/30/2022    CO2 23 06/30/2022    BUN 13 06/30/2022    CREATININE 0.9 06/30/2022    GLUCOSE 107 (H) 06/30/2022    CALCIUM 9.2 06/30/2022    PROT 7.3 06/30/2022    LABALBU 4.7 06/30/2022    BILITOT 0.4 06/30/2022    ALKPHOS 81 06/30/2022    AST 22 06/30/2022    ALT 32 06/30/2022    LABGLOM >60 06/30/2022    GFRAA >60 06/30/2022    AGRATIO 1.7 12/13/2021     Immunology:  Lab Results Component Value Date    PROT 7.3 06/30/2022     Coagulation Panel:  Lab Results   Component Value Date    PROTIME 11.5 12/13/2021    INR 1.02 12/13/2021      Observations:  No data recorded     Assessment & Plan:  1. He was referred for evaluation of leukopenia and thrombocytopenia. CBC on on January 21, 2022 showed WBC 1.9, hemoglobin 15.4, MCV 77.7, platelet 28. Absolute neutrophil was 0.8. Bone marrow in January 2022 was reviewed. He could have ITP. He responded to pulse dexamethasone well with normalization of the platelet count. He had hiccups because of the dexamethasone. 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 I will continue to monitor CBC. 2.  Ultrasound of abdomen showed fatty liver. I recommend to cut back on his alcohol. He also has mild splenomegaly. 3. I recommend to keep age-appropriate cancer screening up-to-date. Reportedly he has stool occult in 2021. 4. He has moderate aortic stenosis as per ECHO. He will see cardiologist.    Return to clinic in 4 months or sooner. All of his question has been answered for today. I have discussed the above stated plan with the patient and they verbalized understanding and agreed with the plan.

## 2022-06-02 ENCOUNTER — HOSPITAL ENCOUNTER (OUTPATIENT)
Dept: INFUSION THERAPY | Age: 62
Discharge: HOME | End: 2022-06-02
Payer: COMMERCIAL

## 2022-06-02 DIAGNOSIS — D69.6 THROMBOCYTOPENIA (HCC): ICD-10-CM

## 2022-06-02 LAB
ALBUMIN SERPL-MCNC: 4.6 GM/DL (ref 3.4–5)
ALP BLD-CCNC: 77 IU/L (ref 40–129)
ALT SERPL-CCNC: 31 U/L (ref 10–40)
ANION GAP SERPL CALCULATED.3IONS-SCNC: 13 MMOL/L (ref 4–16)
AST SERPL-CCNC: 22 IU/L (ref 15–37)
BASOPHILS ABSOLUTE: 0 K/CU MM
BASOPHILS RELATIVE PERCENT: 0.3 % (ref 0–1)
BILIRUB SERPL-MCNC: 0.4 MG/DL (ref 0–1)
BUN BLDV-MCNC: 13 MG/DL (ref 6–23)
CALCIUM SERPL-MCNC: 9.3 MG/DL (ref 8.3–10.6)
CHLORIDE BLD-SCNC: 102 MMOL/L (ref 99–110)
CO2: 22 MMOL/L (ref 21–32)
CREAT SERPL-MCNC: 0.9 MG/DL (ref 0.9–1.3)
DIFFERENTIAL TYPE: ABNORMAL
EOSINOPHILS ABSOLUTE: 0 K/CU MM
EOSINOPHILS RELATIVE PERCENT: 0.3 % (ref 0–3)
GFR AFRICAN AMERICAN: >60 ML/MIN/1.73M2
GFR NON-AFRICAN AMERICAN: >60 ML/MIN/1.73M2
GLUCOSE BLD-MCNC: 95 MG/DL (ref 70–99)
HCT VFR BLD CALC: 40.9 % (ref 42–52)
HEMOGLOBIN: 14.1 GM/DL (ref 13.5–18)
LYMPHOCYTES ABSOLUTE: 0.9 K/CU MM
LYMPHOCYTES RELATIVE PERCENT: 25.4 % (ref 24–44)
MCH RBC QN AUTO: 28.5 PG (ref 27–31)
MCHC RBC AUTO-ENTMCNC: 34.5 % (ref 32–36)
MCV RBC AUTO: 82.6 FL (ref 78–100)
MONOCYTES ABSOLUTE: 0.5 K/CU MM
MONOCYTES RELATIVE PERCENT: 12.8 % (ref 0–4)
PDW BLD-RTO: 13.4 % (ref 11.7–14.9)
PLATELET # BLD: 92 K/CU MM (ref 140–440)
PMV BLD AUTO: 10.5 FL (ref 7.5–11.1)
POTASSIUM SERPL-SCNC: 3.7 MMOL/L (ref 3.5–5.1)
RBC # BLD: 4.95 M/CU MM (ref 4.6–6.2)
SEGMENTED NEUTROPHILS ABSOLUTE COUNT: 2.2 K/CU MM
SEGMENTED NEUTROPHILS RELATIVE PERCENT: 61.2 % (ref 36–66)
SODIUM BLD-SCNC: 137 MMOL/L (ref 135–145)
TOTAL PROTEIN: 7.2 GM/DL (ref 6.4–8.2)
WBC # BLD: 3.7 K/CU MM (ref 4–10.5)

## 2022-06-02 PROCEDURE — 80053 COMPREHEN METABOLIC PANEL: CPT

## 2022-06-02 PROCEDURE — 85025 COMPLETE CBC W/AUTO DIFF WBC: CPT

## 2022-06-02 PROCEDURE — 36415 COLL VENOUS BLD VENIPUNCTURE: CPT

## 2022-06-09 RX ORDER — ATORVASTATIN CALCIUM 40 MG/1
40 TABLET, FILM COATED ORAL DAILY
Qty: 30 TABLET | Refills: 5 | Status: SHIPPED | OUTPATIENT
Start: 2022-06-09 | End: 2022-06-15 | Stop reason: SDUPTHER

## 2022-06-09 RX ORDER — LISINOPRIL AND HYDROCHLOROTHIAZIDE 12.5; 1 MG/1; MG/1
1 TABLET ORAL DAILY
Qty: 30 TABLET | Refills: 5 | Status: SHIPPED | OUTPATIENT
Start: 2022-06-09 | End: 2022-06-15 | Stop reason: SDUPTHER

## 2022-06-15 ENCOUNTER — OFFICE VISIT (OUTPATIENT)
Dept: FAMILY MEDICINE CLINIC | Age: 62
End: 2022-06-15
Payer: COMMERCIAL

## 2022-06-15 VITALS
SYSTOLIC BLOOD PRESSURE: 124 MMHG | OXYGEN SATURATION: 98 % | WEIGHT: 235 LBS | HEART RATE: 82 BPM | HEIGHT: 70 IN | BODY MASS INDEX: 33.64 KG/M2 | DIASTOLIC BLOOD PRESSURE: 64 MMHG | RESPIRATION RATE: 16 BRPM

## 2022-06-15 DIAGNOSIS — I70.0 AORTIC CALCIFICATION (HCC): ICD-10-CM

## 2022-06-15 DIAGNOSIS — E78.5 HYPERLIPIDEMIA, UNSPECIFIED HYPERLIPIDEMIA TYPE: ICD-10-CM

## 2022-06-15 DIAGNOSIS — I10 ESSENTIAL HYPERTENSION: Primary | ICD-10-CM

## 2022-06-15 DIAGNOSIS — R01.1 HEART MURMUR: ICD-10-CM

## 2022-06-15 DIAGNOSIS — Z12.5 PROSTATE CANCER SCREENING: ICD-10-CM

## 2022-06-15 DIAGNOSIS — K76.0 FATTY LIVER: ICD-10-CM

## 2022-06-15 PROCEDURE — 99214 OFFICE O/P EST MOD 30 MIN: CPT | Performed by: STUDENT IN AN ORGANIZED HEALTH CARE EDUCATION/TRAINING PROGRAM

## 2022-06-15 RX ORDER — LISINOPRIL AND HYDROCHLOROTHIAZIDE 12.5; 1 MG/1; MG/1
1 TABLET ORAL DAILY
Qty: 30 TABLET | Refills: 5 | Status: SHIPPED | OUTPATIENT
Start: 2022-06-15

## 2022-06-15 RX ORDER — ATORVASTATIN CALCIUM 40 MG/1
40 TABLET, FILM COATED ORAL DAILY
Qty: 30 TABLET | Refills: 5 | Status: SHIPPED | OUTPATIENT
Start: 2022-06-15

## 2022-06-15 SDOH — HEALTH STABILITY: PHYSICAL HEALTH: ON AVERAGE, HOW MANY MINUTES DO YOU ENGAGE IN EXERCISE AT THIS LEVEL?: 20 MIN

## 2022-06-15 SDOH — HEALTH STABILITY: PHYSICAL HEALTH: ON AVERAGE, HOW MANY DAYS PER WEEK DO YOU ENGAGE IN MODERATE TO STRENUOUS EXERCISE (LIKE A BRISK WALK)?: 1 DAY

## 2022-06-15 ASSESSMENT — ENCOUNTER SYMPTOMS
WHEEZING: 0
SHORTNESS OF BREATH: 0
NAUSEA: 0
ABDOMINAL PAIN: 0
SORE THROAT: 0

## 2022-06-15 ASSESSMENT — SOCIAL DETERMINANTS OF HEALTH (SDOH)
WITHIN THE LAST YEAR, HAVE TO BEEN RAPED OR FORCED TO HAVE ANY KIND OF SEXUAL ACTIVITY BY YOUR PARTNER OR EX-PARTNER?: NO
WITHIN THE LAST YEAR, HAVE YOU BEEN HUMILIATED OR EMOTIONALLY ABUSED IN OTHER WAYS BY YOUR PARTNER OR EX-PARTNER?: NO
WITHIN THE LAST YEAR, HAVE YOU BEEN KICKED, HIT, SLAPPED, OR OTHERWISE PHYSICALLY HURT BY YOUR PARTNER OR EX-PARTNER?: NO
WITHIN THE LAST YEAR, HAVE YOU BEEN AFRAID OF YOUR PARTNER OR EX-PARTNER?: NO

## 2022-06-15 NOTE — PROGRESS NOTES
6/15/2022    Eddi Villarreal    Chief Complaint   Patient presents with    6 Month Follow-Up     Presenting for 6 month follow up visit. [facility]     Previous Dr. Tara Lin patient. HPI  History was obtained from pateint. Lethia Riedel is a 63 y.o. male with a PMHx as listed below who presents today for 6 month follow up on chronic condtions. No acute complatins. Followed with Dr. Jacky Chavarria, janettea ITP however PLT trneidng up no 92 WBC 3.7. bone    Systolic murmur heard today patient states he has     1. Essential hypertension    2. Hyperlipidemia, unspecified hyperlipidemia type    3. Fatty liver    4. Prostate cancer screening    5. Heart murmur    6. Aortic calcification (HCC)             REVIEW OF SYMPTOMS    Review of Systems   Constitutional: Negative for chills and fatigue. HENT: Negative for congestion and sore throat. Respiratory: Negative for shortness of breath and wheezing. Cardiovascular: Negative for chest pain and palpitations. Gastrointestinal: Negative for abdominal pain and nausea. Genitourinary: Negative for frequency and urgency. Neurological: Negative for light-headedness.        PAST MEDICAL HISTORY  Past Medical History:   Diagnosis Date    Hyperlipidemia     Impotence of organic origin        FAMILY HISTORY  Family History   Problem Relation Age of Onset    Other Mother        SOCIAL HISTORY  Social History     Socioeconomic History    Marital status:      Spouse name: None    Number of children: None    Years of education: None    Highest education level: None   Occupational History    None   Tobacco Use    Smoking status: Never Smoker    Smokeless tobacco: Never Used   Substance and Sexual Activity    Alcohol use: Yes     Comment: SOCIAL    Drug use: Never    Sexual activity: None   Other Topics Concern    None   Social History Narrative    None     Social Determinants of Health     Financial Resource Strain:     Difficulty of Paying Living Expenses: Not on file   Food Insecurity:     Worried About Running Out of Food in the Last Year: Not on file    Manohar of Food in the Last Year: Not on file   Transportation Needs:     Lack of Transportation (Medical): Not on file    Lack of Transportation (Non-Medical): Not on file   Physical Activity: Insufficiently Active    Days of Exercise per Week: 1 day    Minutes of Exercise per Session: 20 min   Stress:     Feeling of Stress : Not on file   Social Connections:     Frequency of Communication with Friends and Family: Not on file    Frequency of Social Gatherings with Friends and Family: Not on file    Attends [employer] Services: Not on file    Active Member of Clubs or Organizations: Not on file    Attends Club or Organization Meetings: Not on file    Marital Status: Not on file   Intimate Partner Violence: Not At Risk    Fear of Current or Ex-Partner: No    Emotionally Abused: No    Physically Abused: No    Sexually Abused: No   Housing Stability:     Unable to Pay for Housing in the Last Year: Not on file    Number of [place] in the Last Year: Not on file    Unstable Housing in the Last Year: Not on file        SURGICAL HISTORY  History reviewed. No pertinent surgical history. CURRENT MEDICATIONS  Current Outpatient Medications   Medication Sig Dispense Refill    atorvastatin (LIPITOR) 40 MG tablet Take 1 tablet by mouth daily 30 tablet 5    lisinopril-hydroCHLOROthiazide (PRINZIDE;ZESTORETIC) 10-12.5 MG per tablet Take 1 tablet by mouth daily 30 tablet 5    metoclopramide (REGLAN) 5 MG tablet Take 1 tablet by mouth daily as needed (hiccups) 10 tablet 0    Omeprazole 20 MG TBDD       Cyanocobalamin (VITAMIN B 12 PO) Take by mouth      Cholecalciferol (VITAMIN D3 PO) Take by mouth      MELATONIN PO Take by mouth       No current facility-administered medications for this visit.        ALLERGIES  No Known Allergies    PHYSICAL EXAM    /64   Pulse 82   Resp 16   Ht 5' 10\" (1.778 m)   Wt 235 lb (106.6 kg)   SpO2 98%   BMI 33.72 kg/m²     Physical Exam  Constitutional:       Appearance: Normal appearance. HENT:      Head: Normocephalic and atraumatic. Eyes:      Extraocular Movements: Extraocular movements intact. Pupils: Pupils are equal, round, and reactive to light. Cardiovascular:      Rate and Rhythm: Normal rate and regular rhythm. Pulses: Normal pulses. Heart sounds: No murmur heard. No friction rub. No gallop. Comments: systolic heart murmur  Pulmonary:      Effort: Pulmonary effort is normal.      Breath sounds: Normal breath sounds. Musculoskeletal:      Cervical back: Neck supple. Skin:     General: Skin is warm and dry. Neurological:      General: No focal deficit present. Mental Status: He is alert. Psychiatric:         Mood and Affect: Mood normal.         Behavior: Behavior normal.         ASSESSMENT & PLAN    1. Essential hypertension    - lisinopril-hydroCHLOROthiazide (PRINZIDE;ZESTORETIC) 10-12.5 MG per tablet; Take 1 tablet by mouth daily  Dispense: 30 tablet; Refill: 5    2. Hyperlipidemia, unspecified hyperlipidemia type    - atorvastatin (LIPITOR) 40 MG tablet; Take 1 tablet by mouth daily  Dispense: 30 tablet; Refill: 5    3. Fatty liver      4. Prostate cancer screening    - PSA Screening; Future    5. Heart murmur    - Echo 2d w doppler w contrast limited; Future    6. Aortic calcification (HCC)    - Echo 2d w doppler w contrast limited;  Future  - Ever Salinas MD, Cardiology, [facility]    Patient has [facility] Aortic calcification seen we will follow up on   BP stable  Labs reviewd continue statin  Hx fatty liver LFTs nl    The 10-year ASCVD risk score (Judson Gupta., et al., 2013) is: 11.3%    Values used to calculate the score:      Age: 63 years      Sex: Male      Is Non- : No      Diabetic: No      Tobacco smoker: No Systolic Blood Pressure: 844 mmHg      Is BP treated: Yes      HDL Cholesterol: 33 mg/dL      Total Cholesterol: 175 mg/dL    Return in about 6 months (around 12/15/2022).          Electronically signed by Augie Anthony DO on 6/15/2022

## 2022-06-29 ENCOUNTER — HOSPITAL ENCOUNTER (OUTPATIENT)
Dept: NON INVASIVE DIAGNOSTICS | Age: 62
Discharge: HOME | End: 2022-06-29
Payer: COMMERCIAL

## 2022-06-29 LAB
LV EF: 55 %
LVEF MODALITY: NORMAL

## 2022-06-29 PROCEDURE — 93306 TTE W/DOPPLER COMPLETE: CPT

## 2022-06-30 ENCOUNTER — HOSPITAL ENCOUNTER (OUTPATIENT)
Dept: INFUSION THERAPY | Age: 62
Discharge: HOME | End: 2022-06-30
Payer: COMMERCIAL

## 2022-06-30 ENCOUNTER — OFFICE VISIT (OUTPATIENT)
Dept: ONCOLOGY | Age: 62
End: 2022-06-30
Payer: COMMERCIAL

## 2022-06-30 VITALS
WEIGHT: 236.4 LBS | BODY MASS INDEX: 33.84 KG/M2 | TEMPERATURE: 98.1 F | DIASTOLIC BLOOD PRESSURE: 85 MMHG | SYSTOLIC BLOOD PRESSURE: 130 MMHG | OXYGEN SATURATION: 97 % | RESPIRATION RATE: 18 BRPM | HEART RATE: 92 BPM | HEIGHT: 70 IN

## 2022-06-30 DIAGNOSIS — D69.6 THROMBOCYTOPENIA (HCC): Primary | ICD-10-CM

## 2022-06-30 DIAGNOSIS — D69.6 THROMBOCYTOPENIA (HCC): ICD-10-CM

## 2022-06-30 LAB
ALBUMIN SERPL-MCNC: 4.7 GM/DL (ref 3.4–5)
ALP BLD-CCNC: 81 IU/L (ref 40–128)
ALT SERPL-CCNC: 32 U/L (ref 10–40)
ANION GAP SERPL CALCULATED.3IONS-SCNC: 12 MMOL/L (ref 4–16)
AST SERPL-CCNC: 22 IU/L (ref 15–37)
BASOPHILS ABSOLUTE: 0 K/CU MM
BASOPHILS RELATIVE PERCENT: 0.2 % (ref 0–1)
BILIRUB SERPL-MCNC: 0.4 MG/DL (ref 0–1)
BUN BLDV-MCNC: 13 MG/DL (ref 6–23)
CALCIUM SERPL-MCNC: 9.2 MG/DL (ref 8.3–10.6)
CHLORIDE BLD-SCNC: 100 MMOL/L (ref 99–110)
CO2: 23 MMOL/L (ref 21–32)
CREAT SERPL-MCNC: 0.9 MG/DL (ref 0.9–1.3)
DIFFERENTIAL TYPE: ABNORMAL
EOSINOPHILS ABSOLUTE: 0 K/CU MM
EOSINOPHILS RELATIVE PERCENT: 0.2 % (ref 0–3)
GFR AFRICAN AMERICAN: >60 ML/MIN/1.73M2
GFR NON-AFRICAN AMERICAN: >60 ML/MIN/1.73M2
GLUCOSE BLD-MCNC: 107 MG/DL (ref 70–99)
HCT VFR BLD CALC: 42.2 % (ref 42–52)
HEMOGLOBIN: 14.9 GM/DL (ref 13.5–18)
LYMPHOCYTES ABSOLUTE: 1 K/CU MM
LYMPHOCYTES RELATIVE PERCENT: 23 % (ref 24–44)
MCH RBC QN AUTO: 28.6 PG (ref 27–31)
MCHC RBC AUTO-ENTMCNC: 35.3 % (ref 32–36)
MCV RBC AUTO: 81 FL (ref 78–100)
MONOCYTES ABSOLUTE: 0.5 K/CU MM
MONOCYTES RELATIVE PERCENT: 12.6 % (ref 0–4)
PDW BLD-RTO: 13.3 % (ref 11.7–14.9)
PLATELET # BLD: 70 K/CU MM (ref 140–440)
PMV BLD AUTO: 10.7 FL (ref 7.5–11.1)
POTASSIUM SERPL-SCNC: 3.6 MMOL/L (ref 3.5–5.1)
RBC # BLD: 5.21 M/CU MM (ref 4.6–6.2)
SEGMENTED NEUTROPHILS ABSOLUTE COUNT: 2.6 K/CU MM
SEGMENTED NEUTROPHILS RELATIVE PERCENT: 64 % (ref 36–66)
SODIUM BLD-SCNC: 135 MMOL/L (ref 135–145)
TOTAL PROTEIN: 7.3 GM/DL (ref 6.4–8.2)
WBC # BLD: 4.1 K/CU MM (ref 4–10.5)

## 2022-06-30 PROCEDURE — 85025 COMPLETE CBC W/AUTO DIFF WBC: CPT

## 2022-06-30 PROCEDURE — 99214 OFFICE O/P EST MOD 30 MIN: CPT | Performed by: INTERNAL MEDICINE

## 2022-06-30 PROCEDURE — 36415 COLL VENOUS BLD VENIPUNCTURE: CPT

## 2022-06-30 PROCEDURE — 80053 COMPREHEN METABOLIC PANEL: CPT

## 2022-06-30 NOTE — PROGRESS NOTES
MA Rooming Questions  Patient: Gema Bentley  MRN: [id number]    Date: 6/30/2022        1. Do you have any new issues?   no         2. Do you need any refills on medications?    no    3. Have you had any imaging done since your last visit?   no    4. Have you been hospitalized or seen in the emergency room since your last visit here?   no    5. Did the patient have a depression screening completed today?  No    No data recorded     PHQ-9 Given to (if applicable):               PHQ-9 Score (if applicable):                     [] Positive     []  Negative              Does question #9 need addressed (if applicable)                     [] Yes    []  No               Iris Paredes CMA

## 2022-07-06 NOTE — RESULT ENCOUNTER NOTE
Please discuss with patient echo showing aortic stenosis/ calcification.  It is important he follows with Cardiology

## 2022-07-21 ENCOUNTER — INITIAL CONSULT (OUTPATIENT)
Dept: CARDIOLOGY CLINIC | Age: 62
End: 2022-07-21
Payer: COMMERCIAL

## 2022-07-21 VITALS
DIASTOLIC BLOOD PRESSURE: 70 MMHG | HEART RATE: 83 BPM | SYSTOLIC BLOOD PRESSURE: 134 MMHG | BODY MASS INDEX: 32.76 KG/M2 | HEIGHT: 71 IN | WEIGHT: 234 LBS

## 2022-07-21 DIAGNOSIS — I10 ESSENTIAL HYPERTENSION: Primary | ICD-10-CM

## 2022-07-21 DIAGNOSIS — E66.09 CLASS 1 OBESITY DUE TO EXCESS CALORIES WITHOUT SERIOUS COMORBIDITY WITH BODY MASS INDEX (BMI) OF 32.0 TO 32.9 IN ADULT: ICD-10-CM

## 2022-07-21 DIAGNOSIS — E78.5 DYSLIPIDEMIA: ICD-10-CM

## 2022-07-21 DIAGNOSIS — Q23.1 BICUSPID AORTIC VALVE: ICD-10-CM

## 2022-07-21 DIAGNOSIS — R01.1 MURMUR, CARDIAC: ICD-10-CM

## 2022-07-21 PROBLEM — Q23.81 BICUSPID AORTIC VALVE: Status: ACTIVE | Noted: 2022-07-21

## 2022-07-21 PROBLEM — E66.811 CLASS 1 OBESITY DUE TO EXCESS CALORIES WITHOUT SERIOUS COMORBIDITY WITH BODY MASS INDEX (BMI) OF 32.0 TO 32.9 IN ADULT: Status: ACTIVE | Noted: 2022-07-21

## 2022-07-21 PROCEDURE — 99204 OFFICE O/P NEW MOD 45 MIN: CPT | Performed by: INTERNAL MEDICINE

## 2022-07-21 PROCEDURE — 93000 ELECTROCARDIOGRAM COMPLETE: CPT | Performed by: INTERNAL MEDICINE

## 2022-07-21 NOTE — PROGRESS NOTES
Earlene Serna MD                                  CARDIOLOGY  NOTE           Chief Complaint:    Chief Complaint   Patient presents with    Irregular Heart Beat     Denies any cardiac complaints. Here pre PCP for heart murmur that he's known he has had for a several years. HPI:     Caitlyn Cardoso is a 63y.o. year old male who presents to the hospital to be evaluated for valvular heart disease  Patient is known to have \" cardiac murmur \" for years. He recently underwent echocardiogram which revealed possible bicuspid aortic valve with moderate aortic stenosis    Patient denies any symptoms of chest pain dyspnea or palpitations  Patient denies any history of syncope presyncope  Patient lives a rather sedentary lifestyle    Patient has prior medical history significant for hypertension and hyperlipidemia for which she is on medication and is compliant. Echocardiogram        Summary   Left ventricular systolic function is normal.   Ejection fraction is visually estimated at 55%. Possible bicuspid aortic valve with fusion of the [place] and LCC and   underdeveloped RCC. Aortic valve appears heavily calcified with moderate   stenosis; mean P mmHg, JADEN: 1.12 cm sq, DVI: 0.3. Trace aortic regurgitation is noted. No evidence of any pericardial effusion. Dilated aortic root (4.6 cm) and ascending aorta (4.82 cm).       Signature      ------------------------------------------------------------------   Electronically signed by Ivan Martinez MD   (Interpreting physician) on 2022 at 08:52 AM   ------------------------------------------------------------------      Current Outpatient Medications   Medication Sig Dispense Refill    atorvastatin (LIPITOR) 40 MG tablet Take 1 tablet by mouth daily 30 tablet 5    lisinopril-hydroCHLOROthiazide (PRINZIDE;ZESTORETIC) 10-12.5 MG per tablet Take 1 tablet by mouth daily 30 tablet 5    metoclopramide (REGLAN) 5 MG tablet Take 1 tablet by mouth daily as needed (hiccups) 10 tablet 0    Omeprazole 20 MG TBDD       Cyanocobalamin (VITAMIN B 12 PO) Take by mouth      Cholecalciferol (VITAMIN D3 PO) Take by mouth      MELATONIN PO Take by mouth       No current facility-administered medications for this visit. Allergies:     Patient has no known allergies. Patient History:    Past Medical History:   Diagnosis Date    Hyperlipidemia     Impotence of organic origin      No past surgical history on file. Family History   Problem Relation Age of Onset    Other Mother      Social History     Tobacco Use    Smoking status: Never    Smokeless tobacco: Never   Substance Use Topics    Alcohol use: Yes     Comment: SOCIAL        Review of Systems:     Constitutional:  No Fever or Weight Loss   Eyes: No Decreased Vision  ENT: No Headaches, Hearing Loss or Vertigo  Cardiovascular: No Chest Pain,  No Shortness of breath, No Palpitations. No Edema   Respiratory: No cough or wheezing . No Respiratory distress   Gastrointestinal: No abdominal pain, appetite loss, blood in stools, constipation, diarrhea or heartburn  Genitourinary: No dysuria, trouble voiding, or hematuria  Musculoskeletal:  denies any new  joint aches , or pain   Integumentary: No rash or pruritis  Neurological: No TIA or stroke symptoms  Psychiatric: No anxiety or depression  Endocrine: No malaise, fatigue or temperature intolerance  Hematologic/Lymphatic: No bleeding problems, blood clots or swollen lymph nodes  Allergic/Immunologic: No nasal congestion or hives        Objective:      Physical Exam:    /70   Pulse 83   Ht 5' 10.8\" (1.798 m)   Wt 234 lb (106.1 kg)   BMI 32.82 kg/m²   Wt Readings from Last 3 Encounters:   07/21/22 234 lb (106.1 kg)   06/30/22 236 lb 6.4 oz (107.2 kg)   06/15/22 235 lb (106.6 kg)     Body mass index is 32.82 kg/m².   Vitals:    07/21/22 1344   BP: 134/70   Pulse: 83        General Appearance and Constitutional: Conversant, Well developed, Well nourished, No acute distress, Non-toxic appearance. HEENT:  Normocephalic, Atraumatic, Bilateral external ears normal, Oropharynx moist, No oral exudates,   Nose normal.   Neck- Normal range of motion, No tenderness, Supple  Eyes:  EOMI, Conjunctiva normal, No discharge. Respiratory:  Normal breath sounds, No respiratory distress, No wheezing, No Rales, No Ronchi. No chest tenderness. Cardiovascular: S1-S2, no added heart sounds, ++ SHAHRZAD appreciated. No gallops, rubs. No Pedal Edema   GI:  Bowel sounds normal, Soft, No tenderness,  :  No costovertebral angle tenderness   Musculoskeletal:  No gross deformities. Back- No tenderness  Integument:  Well hydrated, no rash   Lymphatic:  No lymphadenopathy noted   Neurologic:  Alert & oriented x 3, Normal motor function, normal sensory function, no focal deficits noted   Psychiatric:  Speech and behavior appropriate       Medical decision making and Data review:    DATA:    No results found for: TROPONINT  BNP:  No results found for: PROBNP  PT/INR:  No results found for: PTINR  No results found for: LABA1C  Lab Results   Component Value Date    CHOL 175 12/13/2021    TRIG 190 (H) 12/13/2021    HDL 33 (L) 12/13/2021    LDLCALC 104 (H) 12/13/2021    LDLDIRECT 174 (H) 06/08/2019     Lab Results   Component Value Date    WBC 4.1 06/30/2022    HGB 14.9 06/30/2022    HCT 42.2 06/30/2022    MCV 81.0 06/30/2022    PLT 70 (L) 06/30/2022     TSH: No results found for: TSH  Lab Results   Component Value Date    AST 22 06/30/2022    ALT 32 06/30/2022    BILITOT 0.4 06/30/2022    ALKPHOS 81 06/30/2022         All labs, medications and tests reviewed by myself including data and history from outside source , patient and available family . 1. Essential hypertension    2. Dyslipidemia    3. Murmur, cardiac    4. Class 1 obesity due to excess calories without serious comorbidity with body mass index (BMI) of 32.0 to 32.9 in adult    5.  Bicuspid aortic valve         Impression and Plan:      3 out of 6 systolic ejection murmur  Bicuspid aortic valve  Moderate aortic stenosis  Essential hypertension  Hyperlipidemia  Morbid obesity      Pathophysiology of bicuspid aortic valve and aortic stenosis were discussed with the patient in detail using illustrations. Explained to the patient that there is no medical treatment for aortic stenosis and eventually it will get progressive, will likely require valve replacement at some point in the future. Currently patient is totally asymptomatic will observe. Patient will need annual surveillance echocardiogram  He was warned about the symptoms related to aortic stenosis including chest pain shortness of breath or CHF, lower extremity edema, dizziness presyncope or syncope. Patient verbalizes understanding    At this time we will defer any further work-up, once he is symptomatic will pursue further work-up with SARBJIT, left heart cath, right heart cath etc     In the meanwhile, pursue healthy lifestyle changes. Continue with antihypertensive medication including lisinopril hydrochlorothiazide. Continue with Lipitor 40 mg daily. Return in about 1 year (around 7/21/2023). Counseled extensively and medication compliance urged. We discussed that for the  prevention of ASCVD our  goal is aggressive risk modification. Patient is encouraged to exercise even a brisk walk for 30 minutes  at least 3 to 4 times a week   Various goals were discussed and questions answered. Continue current medications. Appropriate prescriptions are addressed and refills ordered. Questions answered and patient verbalizes understanding. Call for any problems, questions, or concerns.

## 2022-10-02 NOTE — PROGRESS NOTES
dexaPatient Name:  Funmi Walsh  Patient :  1960  Patient MRN:  [id number]     Primary Oncologist: Arlyn Davies MD  Referring Provider: Caden Butt DO     Date of Service: 10/31/2022     Chief Complaint:    Chief Complaint   Patient presents with    Follow-up       He came in for follow-up visit. Patient Active Problem List:     Hyperlipidemia     Impotence of organic origin     Essential hypertension     Ecchymosis     HPI:   Cathy Baeza is a pleasant 62-year-old male patient who was referred for evaluation of leukopenia and thrombocytopenia. In 2015 WBC was 9.93, hemoglobin 14.1, platelet 827. On 2021 WBC was 1.6, hemoglobin 14.5, hematocrit 42, MCV 81, platelet 13. CMP was grossly unremarkable. Peripheral blood smear and histogram are reviewed. Leukopenia is  present with absolute neutropenia. Granulocytes display occasional hypogranularity. Definitive circulating blasts are not identified. There is no evidence of anemia and red blood cells display normal morphology. Platelets are decreased without evidence of clumping. Clinical correlation is recommended. If no etiology is identified for the patient's cytopenias, hematology consultation should be considered with possible bone marrow biopsy to exclude myeloid disorder/neoplasm. He denied any recent infection. He denied any epistaxis or gums bleed. He denied any excessive bleeding. He had vaccine for COVID-19 in April and May 2021. After the vaccine he had quite often easy bruising. Also he reported that he had blood in the tissue when he blow his nose intermittently after the vaccine. He is aware about his heart murmur which he has for more than 10 years. I discussed with him the work-up further leukopenia and or thrombocytopenia. I doubted that he has TTP. I reviewed his medication list.  He denied taking any herbal medication. No tonic water. He drinks 4-5 beers weekly.   Cologuard in  was negative. Bone marrow study on January 31, 2022. A-C. Bone marrow, biopsy, clot section, and aspirate smears:   -     High normocellular marrow with trilineage hematopoiesis  Peripheral blood smear:   -     Leukopenia and thrombocytopenia. Comment   Some dyspoietic features are seen in the erythroid cells. The morphologic features seen are not sufficient alone for a definitive diagnosis of myelodysplasia. FISH testing did not reveal any abnormalities. Karyotyping and molecular testing results are pending. Microscopic description:   Bone marrow biopsy and clot section:   Adequacy: Adequate   Cellularity: ~60%   Myeloid cells: Well-represented with full maturation   Erythroid cells: Well-represented with island formation   Megakaryocytes: Slightly increased with full maturation and focal loose clustering     Bone marrow aspirate smear:   Adequacy: Adequate   Myeloid:erythroid ratio: 1.2:1   Myeloid cells: Well-represented with full maturation   Erythroid cells: Well-represented, some forms with megaloblastoid change are seen   Megakaryocytes: Well-represented with full maturation   Iron stain: stainable iron is present, ring sideroblasts are not seen     Bone marrow differential count: Blasts:  4%   Flow cytometry: No diagnostic immunophenotypic abnormalities detected. FISH:   Chromosome 8: Not detected   Chromosome 20: Not detected   5q-/-5/+5 tricolor: Not detected   7q-/-7 tri: Not detected   CBFB (16q22): Not detected   KMT2A (MLL) (11q23)*: Not detected   PML/ORALIA t(15;17):  Not detected   RUNX1/XKCU6C3 (ETO/AML1) t(8;21): Not detected     Molecular testing by next generation sequencing: No clinically significant mutations detected   Karyotype: 46,XY[20]     Peripheral blood smear:   CBC: WBC: 2.0 k/cumm, Hb: 14.3 g/dL, MCV: 79.9 fL, RDW:   13.6%, platelets: 28 k/cumm   Differential: Neutrophils: 35.3%, lymphocytes: 42.1%,   monocytes: 22.1%, basophils: 0.5%   RBCs: Normocytic normochromic   WBCs: Leukopenia, some larger lymphocytes are seen   Platelets: Decreased with some large platelets seen     US of abdomen 1/31/2022: The spleen appears mildly enlarged measuring 13 cm in length. Increased echogenicty of the liver parenchyma, which may reflect underlying hepatic steatosis or other chronic liver parenchymal disease. I recommend to cut back on his alcohol consumption. CBC on February 7, 2022 showed WBC 2.2, hemoglobin 14.3, platelet 23 with MPV 12. He had a trial of pulse Dex for 4 days with improvement of the platelet counts. He has hiccups from the dexamethasone. In February 2022 CMP was stable with blood sugar 170. White cell was 6.0, hemoglobin 13.7, platelet 410. In June 2022 WBC was 3.7, Hg 14.1 and plt 92. CMP was grossly unremarkable. 10/31/2022 he came in for follow up visit. He denied excessive bruises or petechial rash. I will check CBC today and in 3 months. I advise to call for result. He has cut down on EtOH consumption. 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 He saw cardiologist for moderate aortic stenosis as per ECHO in June 2022. No acute pain. Denied any nausea, vomiting or diarrhea. No fever or chills. No chest pain, shortness of breath or palpitation. No headache or dizzy spell. No specific bone pain. No melena or hematochezia. Denied any dysuria or hematuria. Past Medical History:   Hyperlipidemia   Hypertension  Impotence of organic origin   Chronic ITP     Past Surgery History:    No history of colonoscopy. Bone marrow biopsy and aspiration. Social History:   He denied any smoking history. He drinks 4-5 beers weekly. He has 2 children. Family History:   No malignancy in the family. Review of Systems: The remainder of the review of system is unremarkable.      Vital Signs: BP (!) 140/86 (Site: Left Upper Arm, Position: Sitting, Cuff Size: Large Adult)   Pulse 97   Temp 98.9 °F (37.2 °C) (Temporal)   Ht 5' 10.8\" (1.798 m)   Wt 244 lb 12.8 oz (111 kg)   SpO2 90%   BMI 34.34 kg/m²      Physical Exam:  CONSTITUTIONAL: awake, alert, cooperative, no apparent distress   EYES: pupils equal and round. Sclera clear and conjunctiva normal  ENT: Normocephalic, without obvious abnormality, atraumatic  NECK: supple, symmetrical, no jugular venous distension and no carotid bruits   HEMATOLOGIC/LYMPHATIC: no cervical, supraclavicular or axillary lymphadenopathy   LUNGS: no increased work of breathing and clear to auscultation   CARDIOVASCULAR: regular rate and rhythm, normal S1 and S2, + murmur right upper parasternal border. ABDOMEN: normal bowel sound, soft, non-distended, non-tender, no masses palpated, no hepatosplenomegaly   MUSCULOSKELETAL: full range of motion noted, tone is normal  NEUROLOGIC: Motor skills grossly intact. CN II - XII grossly intact. SKIN: No jaundice. appears intact. No ecchymosis or petechial rash. EXTREMITIES: no LE edema or cyanosis. Homans negative.     Labs:  Hematology:  Lab Results   Component Value Date    WBC 5.2 10/31/2022    RBC 5.28 10/31/2022    HGB 14.9 10/31/2022    HCT 44.1 10/31/2022    MCV 83.5 10/31/2022    MCH 28.2 10/31/2022    MCHC 33.8 10/31/2022    RDW 13.3 10/31/2022    PLT 55 (L) 10/31/2022    MPV 11.1 10/31/2022    SEGSPCT 68.2 (H) 10/31/2022    EOSRELPCT 0.0 10/31/2022    BASOPCT 0.4 10/31/2022    LYMPHOPCT 22.0 (L) 10/31/2022    MONOPCT 9.4 (H) 10/31/2022    SEGSABS 3.6 10/31/2022    EOSABS 0.0 10/31/2022    BASOSABS 0.0 10/31/2022    LYMPHSABS 1.2 10/31/2022    MONOSABS 0.5 10/31/2022    DIFFTYPE AUTOMATED DIFFERENTIAL 10/31/2022     Chemistry:  Lab Results   Component Value Date     10/31/2022    K 3.6 10/31/2022    CL 99 10/31/2022    CO2 22 10/31/2022    BUN 14 10/31/2022    CREATININE 0.9 10/31/2022    GLUCOSE 115 (H) 10/31/2022    CALCIUM 9.9 10/31/2022    PROT 7.5 10/31/2022    LABALBU 4.9 10/31/2022    BILITOT 0.6 10/31/2022    ALKPHOS 84 10/31/2022    AST 28 10/31/2022    ALT 40 10/31/2022 LABGLOM >60 10/31/2022    GFRAA >60 06/30/2022    AGRATIO 1.7 12/13/2021     Immunology:  Lab Results   Component Value Date    PROT 7.5 10/31/2022     Coagulation Panel:  Lab Results   Component Value Date    PROTIME 11.5 12/13/2021    INR 1.02 12/13/2021      Observations:  PHQ-9 Total Score: 0 (10/31/2022  3:27 PM)     Assessment & Plan:  1. He was referred for evaluation of leukopenia and thrombocytopenia. CBC on on January 21, 2022 showed WBC 1.9, hemoglobin 15.4, MCV 77.7, platelet 28. Absolute neutrophil was 0.8. Bone marrow in January 2022 was reviewed. He could have ITP. He responded to pulse dexamethasone well with normalization of the platelet count. He had hiccups because of the dexamethasone. 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 I will check labs today and in 3 months. I advise to call for result. I recommend to watch for ecchymosis or petechial rash. 2.  Ultrasound of abdomen showed fatty liver. I recommend to cut back on his alcohol. He has mild splenomegaly. 3. I recommend to keep age-appropriate cancer screening up-to-date. Reportedly he has stool occult in 2021. 4. He has moderate aortic stenosis as per ECHO. He follows with cardiologist.    Return to clinic in 6 months or sooner. All of his question has been answered for today. I have discussed the above stated plan with the patient and they verbalized understanding and agreed with the plan.

## 2022-10-31 ENCOUNTER — OFFICE VISIT (OUTPATIENT)
Dept: ONCOLOGY | Age: 62
End: 2022-10-31
Payer: COMMERCIAL

## 2022-10-31 ENCOUNTER — HOSPITAL ENCOUNTER (OUTPATIENT)
Dept: INFUSION THERAPY | Age: 62
Discharge: HOME | End: 2022-10-31
Payer: COMMERCIAL

## 2022-10-31 VITALS
DIASTOLIC BLOOD PRESSURE: 86 MMHG | SYSTOLIC BLOOD PRESSURE: 140 MMHG | TEMPERATURE: 98.9 F | WEIGHT: 244.8 LBS | HEART RATE: 97 BPM | OXYGEN SATURATION: 90 % | HEIGHT: 71 IN | BODY MASS INDEX: 34.27 KG/M2

## 2022-10-31 DIAGNOSIS — D69.6 THROMBOCYTOPENIA (HCC): Primary | ICD-10-CM

## 2022-10-31 DIAGNOSIS — D69.6 THROMBOCYTOPENIA (HCC): ICD-10-CM

## 2022-10-31 LAB
ALBUMIN SERPL-MCNC: 4.9 GM/DL (ref 3.4–5)
ALP BLD-CCNC: 84 IU/L (ref 40–128)
ALT SERPL-CCNC: 40 U/L (ref 10–40)
ANION GAP SERPL CALCULATED.3IONS-SCNC: 14 MMOL/L (ref 4–16)
AST SERPL-CCNC: 28 IU/L (ref 15–37)
BASOPHILS ABSOLUTE: 0 K/CU MM
BASOPHILS RELATIVE PERCENT: 0.4 % (ref 0–1)
BILIRUB SERPL-MCNC: 0.6 MG/DL (ref 0–1)
BUN BLDV-MCNC: 14 MG/DL (ref 6–23)
CALCIUM SERPL-MCNC: 9.9 MG/DL (ref 8.3–10.6)
CHLORIDE BLD-SCNC: 99 MMOL/L (ref 99–110)
CO2: 22 MMOL/L (ref 21–32)
CREAT SERPL-MCNC: 0.9 MG/DL (ref 0.9–1.3)
DIFFERENTIAL TYPE: ABNORMAL
EOSINOPHILS ABSOLUTE: 0 K/CU MM
EOSINOPHILS RELATIVE PERCENT: 0 % (ref 0–3)
GFR SERPL CREATININE-BSD FRML MDRD: >60 ML/MIN/1.73M2
GLUCOSE BLD-MCNC: 115 MG/DL (ref 70–99)
HCT VFR BLD CALC: 44.1 % (ref 42–52)
HEMOGLOBIN: 14.9 GM/DL (ref 13.5–18)
LYMPHOCYTES ABSOLUTE: 1.2 K/CU MM
LYMPHOCYTES RELATIVE PERCENT: 22 % (ref 24–44)
MCH RBC QN AUTO: 28.2 PG (ref 27–31)
MCHC RBC AUTO-ENTMCNC: 33.8 % (ref 32–36)
MCV RBC AUTO: 83.5 FL (ref 78–100)
MONOCYTES ABSOLUTE: 0.5 K/CU MM
MONOCYTES RELATIVE PERCENT: 9.4 % (ref 0–4)
PDW BLD-RTO: 13.3 % (ref 11.7–14.9)
PLATELET # BLD: 55 K/CU MM (ref 140–440)
PMV BLD AUTO: 11.1 FL (ref 7.5–11.1)
POTASSIUM SERPL-SCNC: 3.6 MMOL/L (ref 3.5–5.1)
RBC # BLD: 5.28 M/CU MM (ref 4.6–6.2)
SEGMENTED NEUTROPHILS ABSOLUTE COUNT: 3.6 K/CU MM
SEGMENTED NEUTROPHILS RELATIVE PERCENT: 68.2 % (ref 36–66)
SODIUM BLD-SCNC: 135 MMOL/L (ref 135–145)
TOTAL PROTEIN: 7.5 GM/DL (ref 6.4–8.2)
WBC # BLD: 5.2 K/CU MM (ref 4–10.5)

## 2022-10-31 PROCEDURE — 36415 COLL VENOUS BLD VENIPUNCTURE: CPT

## 2022-10-31 PROCEDURE — 3078F DIAST BP <80 MM HG: CPT | Performed by: INTERNAL MEDICINE

## 2022-10-31 PROCEDURE — 85025 COMPLETE CBC W/AUTO DIFF WBC: CPT

## 2022-10-31 PROCEDURE — 3074F SYST BP LT 130 MM HG: CPT | Performed by: INTERNAL MEDICINE

## 2022-10-31 PROCEDURE — 99211 OFF/OP EST MAY X REQ PHY/QHP: CPT

## 2022-10-31 PROCEDURE — 80053 COMPREHEN METABOLIC PANEL: CPT

## 2022-10-31 PROCEDURE — 99213 OFFICE O/P EST LOW 20 MIN: CPT | Performed by: INTERNAL MEDICINE

## 2022-10-31 ASSESSMENT — PATIENT HEALTH QUESTIONNAIRE - PHQ9
SUM OF ALL RESPONSES TO PHQ9 QUESTIONS 1 & 2: 0
SUM OF ALL RESPONSES TO PHQ QUESTIONS 1-9: 0
2. FEELING DOWN, DEPRESSED OR HOPELESS: 0
SUM OF ALL RESPONSES TO PHQ QUESTIONS 1-9: 0
1. LITTLE INTEREST OR PLEASURE IN DOING THINGS: 0
SUM OF ALL RESPONSES TO PHQ QUESTIONS 1-9: 0
SUM OF ALL RESPONSES TO PHQ QUESTIONS 1-9: 0

## 2022-10-31 NOTE — PROGRESS NOTES
MA Rooming Questions  Patient: Bing Martinez  MRN: [id number]    Date: 10/31/2022        1. Do you have any new issues?   no         2. Do you need any refills on medications?    no    3. Have you had any imaging done since your last visit? yes - labs 6/30    4. Have you been hospitalized or seen in the emergency room since your last visit here?   no    5. Did the patient have a depression screening completed today?  Yes    PHQ-9 Total Score: 0 (10/31/2022  3:27 PM)       PHQ-9 Given to (if applicable):               PHQ-9 Score (if applicable):                     [] Positive     []  Negative              Does question #9 need addressed (if applicable)                     [] Yes    []  No               Norman Judge CMA

## 2022-12-12 ENCOUNTER — OFFICE VISIT (OUTPATIENT)
Dept: FAMILY MEDICINE CLINIC | Age: 62
End: 2022-12-12
Payer: COMMERCIAL

## 2022-12-12 VITALS
OXYGEN SATURATION: 98 % | HEIGHT: 70 IN | DIASTOLIC BLOOD PRESSURE: 82 MMHG | SYSTOLIC BLOOD PRESSURE: 116 MMHG | HEART RATE: 81 BPM | BODY MASS INDEX: 34.07 KG/M2 | WEIGHT: 238 LBS

## 2022-12-12 DIAGNOSIS — I10 ESSENTIAL HYPERTENSION: ICD-10-CM

## 2022-12-12 DIAGNOSIS — E78.5 HYPERLIPIDEMIA, UNSPECIFIED HYPERLIPIDEMIA TYPE: ICD-10-CM

## 2022-12-12 DIAGNOSIS — E66.09 CLASS 1 OBESITY DUE TO EXCESS CALORIES WITHOUT SERIOUS COMORBIDITY WITH BODY MASS INDEX (BMI) OF 32.0 TO 32.9 IN ADULT: ICD-10-CM

## 2022-12-12 DIAGNOSIS — Z12.5 ENCOUNTER FOR SCREENING FOR MALIGNANT NEOPLASM OF PROSTATE: Primary | ICD-10-CM

## 2022-12-12 DIAGNOSIS — E78.5 DYSLIPIDEMIA: ICD-10-CM

## 2022-12-12 PROCEDURE — 99214 OFFICE O/P EST MOD 30 MIN: CPT | Performed by: STUDENT IN AN ORGANIZED HEALTH CARE EDUCATION/TRAINING PROGRAM

## 2022-12-12 PROCEDURE — 3078F DIAST BP <80 MM HG: CPT | Performed by: STUDENT IN AN ORGANIZED HEALTH CARE EDUCATION/TRAINING PROGRAM

## 2022-12-12 PROCEDURE — 3074F SYST BP LT 130 MM HG: CPT | Performed by: STUDENT IN AN ORGANIZED HEALTH CARE EDUCATION/TRAINING PROGRAM

## 2022-12-12 RX ORDER — ATORVASTATIN CALCIUM 40 MG/1
40 TABLET, FILM COATED ORAL DAILY
Qty: 30 TABLET | Refills: 5 | Status: SHIPPED | OUTPATIENT
Start: 2022-12-12

## 2022-12-12 RX ORDER — LISINOPRIL AND HYDROCHLOROTHIAZIDE 12.5; 1 MG/1; MG/1
1 TABLET ORAL DAILY
Qty: 30 TABLET | Refills: 5 | Status: SHIPPED | OUTPATIENT
Start: 2022-12-12

## 2022-12-12 ASSESSMENT — ENCOUNTER SYMPTOMS
NAUSEA: 0
WHEEZING: 0
SHORTNESS OF BREATH: 0
ABDOMINAL PAIN: 0
SORE THROAT: 0

## 2022-12-12 NOTE — PROGRESS NOTES
12/12/2022    Funmi Walsh    Chief Complaint   Patient presents with    6 Month Follow-Up     Pt reports no concerns       HPI  History was obtained from patient. Vince Lucas is a 63 y.o. male with a PMHx as listed below who presents today for 6 month follow up on chronic conditions. NO acute complaints. Complaint with medications. BP excellent  Plt and wbcs improving following with Dr. Ct Kim aortic valve, asymptomatic      1. Encounter for screening for malignant neoplasm of prostate    2. Essential hypertension    3. Hyperlipidemia, unspecified hyperlipidemia type    4. Class 1 obesity due to excess calories without serious comorbidity with body mass index (BMI) of 32.0 to 32.9 in adult    5. Dyslipidemia             REVIEW OF SYMPTOMS    Review of Systems   Constitutional:  Negative for chills and fatigue. HENT:  Negative for congestion and sore throat. Respiratory:  Negative for shortness of breath and wheezing. Cardiovascular:  Negative for chest pain and palpitations. Gastrointestinal:  Negative for abdominal pain and nausea. Genitourinary:  Negative for frequency and urgency. Neurological:  Negative for light-headedness.      PAST MEDICAL HISTORY  Past Medical History:   Diagnosis Date    Hyperlipidemia     Impotence of organic origin        FAMILY HISTORY  Family History   Problem Relation Age of Onset    Other Mother        SOCIAL HISTORY  Social History     Socioeconomic History    Marital status:    Tobacco Use    Smoking status: Never    Smokeless tobacco: Never   Substance and Sexual Activity    Alcohol use: Yes     Comment: SOCIAL    Drug use: Never     Social Determinants of Health     Physical Activity: Insufficiently Active    Days of Exercise per Week: 1 day    Minutes of Exercise per Session: 20 min   Intimate Partner Violence: Not At Risk    Fear of Current or Ex-Partner: No    Emotionally Abused: No    Physically Abused: No    Sexually Abused: No        SURGICAL HISTORY  No past surgical history on file. CURRENT MEDICATIONS  Current Outpatient Medications   Medication Sig Dispense Refill    lisinopril-hydroCHLOROthiazide (PRINZIDE;ZESTORETIC) 10-12.5 MG per tablet Take 1 tablet by mouth daily 30 tablet 5    atorvastatin (LIPITOR) 40 MG tablet Take 1 tablet by mouth daily 30 tablet 5    Omeprazole 20 MG TBDD       Cholecalciferol (VITAMIN D3 PO) Take by mouth      MELATONIN PO Take by mouth       No current facility-administered medications for this visit. ALLERGIES  No Known Allergies    PHYSICAL EXAM    /82 (Site: Left Upper Arm, Position: Sitting, Cuff Size: Medium Adult)   Pulse 81   Ht 5' 10\" (1.778 m)   Wt 238 lb (108 kg)   SpO2 98%   BMI 34.15 kg/m²     Physical Exam  Constitutional:       Appearance: Normal appearance. HENT:      Head: Normocephalic and atraumatic. Eyes:      Extraocular Movements: Extraocular movements intact. Pupils: Pupils are equal, round, and reactive to light. Cardiovascular:      Rate and Rhythm: Normal rate and regular rhythm. Pulses: Normal pulses. Heart sounds: No murmur heard. No friction rub. No gallop. Skin:     General: Skin is warm and dry. Neurological:      General: No focal deficit present. Mental Status: He is alert. Psychiatric:         Mood and Affect: Mood normal.         Behavior: Behavior normal.       ASSESSMENT & PLAN    1. Essential hypertension    - lisinopril-hydroCHLOROthiazide (PRINZIDE;ZESTORETIC) 10-12.5 MG per tablet; Take 1 tablet by mouth daily  Dispense: 30 tablet; Refill: 5    2. Hyperlipidemia, unspecified hyperlipidemia type    - atorvastatin (LIPITOR) 40 MG tablet; Take 1 tablet by mouth daily  Dispense: 30 tablet; Refill: 5  - Lipid, Fasting; Future    3. Encounter for screening for malignant neoplasm of prostate    - PSA, Prostatic Specific Antigen; Future    4.  Class 1 obesity due to excess calories without serious comorbidity with body mass index (BMI) of 32.0 to 32.9 in adult      5. Dyslipidemia      Bp excellent  Weight trending down  F/u labs  Cholesterol has been controlled on current regimen   Bicuspid aortic valve, asymptomatic    Return in about 6 months (around 6/12/2023).          Electronically signed by Iza Hyatt DO on 12/12/2022

## 2023-01-30 ENCOUNTER — HOSPITAL ENCOUNTER (OUTPATIENT)
Dept: INFUSION THERAPY | Age: 63
Discharge: HOME | End: 2023-01-30
Payer: COMMERCIAL

## 2023-01-30 DIAGNOSIS — D69.6 THROMBOCYTOPENIA (HCC): ICD-10-CM

## 2023-01-30 LAB
ALBUMIN SERPL-MCNC: 5 GM/DL (ref 3.4–5)
ALP BLD-CCNC: 81 IU/L (ref 40–129)
ALT SERPL-CCNC: 37 U/L (ref 10–40)
ANION GAP SERPL CALCULATED.3IONS-SCNC: 13 MMOL/L (ref 4–16)
AST SERPL-CCNC: 23 IU/L (ref 15–37)
BASOPHILS ABSOLUTE: 0 K/CU MM
BASOPHILS RELATIVE PERCENT: 0.2 % (ref 0–1)
BILIRUB SERPL-MCNC: 0.5 MG/DL (ref 0–1)
BUN BLDV-MCNC: 16 MG/DL (ref 6–23)
CALCIUM SERPL-MCNC: 9.3 MG/DL (ref 8.3–10.6)
CHLORIDE BLD-SCNC: 99 MMOL/L (ref 99–110)
CO2: 24 MMOL/L (ref 21–32)
CREAT SERPL-MCNC: 0.9 MG/DL (ref 0.9–1.3)
DIFFERENTIAL TYPE: ABNORMAL
EOSINOPHILS ABSOLUTE: 0 K/CU MM
EOSINOPHILS RELATIVE PERCENT: 0 % (ref 0–3)
GFR SERPL CREATININE-BSD FRML MDRD: >60 ML/MIN/1.73M2
GLUCOSE BLD-MCNC: 94 MG/DL (ref 70–99)
HCT VFR BLD CALC: 45 % (ref 42–52)
HEMOGLOBIN: 15.4 GM/DL (ref 13.5–18)
LYMPHOCYTES ABSOLUTE: 1.3 K/CU MM
LYMPHOCYTES RELATIVE PERCENT: 26.1 % (ref 24–44)
MCH RBC QN AUTO: 28 PG (ref 27–31)
MCHC RBC AUTO-ENTMCNC: 34.2 % (ref 32–36)
MCV RBC AUTO: 81.8 FL (ref 78–100)
MONOCYTES ABSOLUTE: 0.5 K/CU MM
MONOCYTES RELATIVE PERCENT: 9.4 % (ref 0–4)
PDW BLD-RTO: 13.4 % (ref 11.7–14.9)
PLATELET # BLD: 67 K/CU MM (ref 140–440)
PMV BLD AUTO: 11.4 FL (ref 7.5–11.1)
POTASSIUM SERPL-SCNC: 3.9 MMOL/L (ref 3.5–5.1)
RBC # BLD: 5.5 M/CU MM (ref 4.6–6.2)
SEGMENTED NEUTROPHILS ABSOLUTE COUNT: 3.2 K/CU MM
SEGMENTED NEUTROPHILS RELATIVE PERCENT: 64.3 % (ref 36–66)
SODIUM BLD-SCNC: 136 MMOL/L (ref 135–145)
TOTAL PROTEIN: 7.6 GM/DL (ref 6.4–8.2)
WBC # BLD: 4.9 K/CU MM (ref 4–10.5)

## 2023-01-30 PROCEDURE — 85025 COMPLETE CBC W/AUTO DIFF WBC: CPT

## 2023-01-30 PROCEDURE — 36415 COLL VENOUS BLD VENIPUNCTURE: CPT

## 2023-01-30 PROCEDURE — 80053 COMPREHEN METABOLIC PANEL: CPT

## 2023-04-24 ENCOUNTER — HOSPITAL ENCOUNTER (OUTPATIENT)
Dept: INFUSION THERAPY | Age: 63
Discharge: HOME | End: 2023-04-24
Payer: COMMERCIAL

## 2023-04-24 DIAGNOSIS — D69.6 THROMBOCYTOPENIA (HCC): ICD-10-CM

## 2023-04-24 LAB
ALBUMIN SERPL-MCNC: 4.8 GM/DL (ref 3.4–5)
ALP BLD-CCNC: 80 IU/L (ref 40–129)
ALT SERPL-CCNC: 42 U/L (ref 10–40)
ANION GAP SERPL CALCULATED.3IONS-SCNC: 15 MMOL/L (ref 4–16)
AST SERPL-CCNC: 25 IU/L (ref 15–37)
BASOPHILS ABSOLUTE: 0 K/CU MM
BASOPHILS RELATIVE PERCENT: 0.2 % (ref 0–1)
BILIRUB SERPL-MCNC: 0.6 MG/DL (ref 0–1)
BUN SERPL-MCNC: 13 MG/DL (ref 6–23)
CALCIUM SERPL-MCNC: 9.4 MG/DL (ref 8.3–10.6)
CHLORIDE BLD-SCNC: 97 MMOL/L (ref 99–110)
CO2: 24 MMOL/L (ref 21–32)
CREAT SERPL-MCNC: 1 MG/DL (ref 0.9–1.3)
DIFFERENTIAL TYPE: ABNORMAL
EOSINOPHILS ABSOLUTE: 0 K/CU MM
EOSINOPHILS RELATIVE PERCENT: 0 % (ref 0–3)
GFR SERPL CREATININE-BSD FRML MDRD: >60 ML/MIN/1.73M2
GLUCOSE SERPL-MCNC: 121 MG/DL (ref 70–99)
HCT VFR BLD CALC: 42.5 % (ref 42–52)
HEMOGLOBIN: 15 GM/DL (ref 13.5–18)
LYMPHOCYTES ABSOLUTE: 1.2 K/CU MM
LYMPHOCYTES RELATIVE PERCENT: 26 % (ref 24–44)
MCH RBC QN AUTO: 28.6 PG (ref 27–31)
MCHC RBC AUTO-ENTMCNC: 35.3 % (ref 32–36)
MCV RBC AUTO: 81 FL (ref 78–100)
MONOCYTES ABSOLUTE: 0.4 K/CU MM
MONOCYTES RELATIVE PERCENT: 9.4 % (ref 0–4)
PDW BLD-RTO: 13.6 % (ref 11.7–14.9)
PLATELET # BLD: 58 K/CU MM (ref 140–440)
PMV BLD AUTO: 10.7 FL (ref 7.5–11.1)
POTASSIUM SERPL-SCNC: 3.7 MMOL/L (ref 3.5–5.1)
RBC # BLD: 5.25 M/CU MM (ref 4.6–6.2)
SEGMENTED NEUTROPHILS ABSOLUTE COUNT: 2.9 K/CU MM
SEGMENTED NEUTROPHILS RELATIVE PERCENT: 64.4 % (ref 36–66)
SODIUM BLD-SCNC: 136 MMOL/L (ref 135–145)
TOTAL PROTEIN: 7.4 GM/DL (ref 6.4–8.2)
WBC # BLD: 4.5 K/CU MM (ref 4–10.5)

## 2023-04-24 PROCEDURE — 36415 COLL VENOUS BLD VENIPUNCTURE: CPT

## 2023-04-24 PROCEDURE — 80053 COMPREHEN METABOLIC PANEL: CPT

## 2023-04-24 PROCEDURE — 85025 COMPLETE CBC W/AUTO DIFF WBC: CPT

## 2023-05-01 ENCOUNTER — OFFICE VISIT (OUTPATIENT)
Dept: ONCOLOGY | Age: 63
End: 2023-05-01
Payer: COMMERCIAL

## 2023-05-01 ENCOUNTER — HOSPITAL ENCOUNTER (OUTPATIENT)
Dept: INFUSION THERAPY | Age: 63
Discharge: HOME | End: 2023-05-01
Payer: COMMERCIAL

## 2023-05-01 VITALS
OXYGEN SATURATION: 99 % | TEMPERATURE: 98.1 F | WEIGHT: 237 LBS | BODY MASS INDEX: 33.93 KG/M2 | HEIGHT: 70 IN | HEART RATE: 88 BPM | SYSTOLIC BLOOD PRESSURE: 133 MMHG | DIASTOLIC BLOOD PRESSURE: 85 MMHG

## 2023-05-01 DIAGNOSIS — D69.6 THROMBOCYTOPENIA (HCC): Primary | ICD-10-CM

## 2023-05-01 PROCEDURE — 3079F DIAST BP 80-89 MM HG: CPT | Performed by: INTERNAL MEDICINE

## 2023-05-01 PROCEDURE — 3075F SYST BP GE 130 - 139MM HG: CPT | Performed by: INTERNAL MEDICINE

## 2023-05-01 PROCEDURE — 99211 OFF/OP EST MAY X REQ PHY/QHP: CPT

## 2023-05-01 PROCEDURE — 99213 OFFICE O/P EST LOW 20 MIN: CPT | Performed by: INTERNAL MEDICINE

## 2023-05-01 ASSESSMENT — PATIENT HEALTH QUESTIONNAIRE - PHQ9
SUM OF ALL RESPONSES TO PHQ QUESTIONS 1-9: 0
SUM OF ALL RESPONSES TO PHQ QUESTIONS 1-9: 0
1. LITTLE INTEREST OR PLEASURE IN DOING THINGS: 0
2. FEELING DOWN, DEPRESSED OR HOPELESS: 0
SUM OF ALL RESPONSES TO PHQ QUESTIONS 1-9: 0
SUM OF ALL RESPONSES TO PHQ9 QUESTIONS 1 & 2: 0
SUM OF ALL RESPONSES TO PHQ QUESTIONS 1-9: 0

## 2023-05-01 NOTE — PROGRESS NOTES
MA Rooming Questions  Patient: Solo Braden  MRN: [id number]    Date: 5/1/2023        1. Do you have any new issues?   no         2. Do you need any refills on medications?    no    3. Have you had any imaging done since your last visit?   no    4. Have you been hospitalized or seen in the emergency room since your last visit here?   no    5. Did the patient have a depression screening completed today?  Yes    No data recorded     PHQ-9 Given to (if applicable):               PHQ-9 Score (if applicable):                     [] Positive     [x]  Negative              Does question #9 need addressed (if applicable)                     [] Yes    []  No               Unknown SAFIA Marks

## 2023-06-20 ENCOUNTER — HOSPITAL ENCOUNTER (OUTPATIENT)
Age: 63
Discharge: HOME | End: 2023-06-20
Payer: COMMERCIAL

## 2023-06-20 DIAGNOSIS — E78.5 HYPERLIPIDEMIA, UNSPECIFIED HYPERLIPIDEMIA TYPE: ICD-10-CM

## 2023-06-20 DIAGNOSIS — N40.0 BENIGN PROSTATIC HYPERPLASIA, UNSPECIFIED WHETHER LOWER URINARY TRACT SYMPTOMS PRESENT: ICD-10-CM

## 2023-06-20 LAB
CHOLESTEROL, FASTING: 154 MG/DL
HDLC SERPL-MCNC: 40 MG/DL
LDLC SERPL CALC-MCNC: 76 MG/DL
PROSTATE SPECIFIC ANTIGEN: 4.91 NG/ML (ref 0–4)
TRIGLYCERIDE, FASTING: 190 MG/DL

## 2023-06-20 PROCEDURE — 80061 LIPID PANEL: CPT

## 2023-06-20 PROCEDURE — 36415 COLL VENOUS BLD VENIPUNCTURE: CPT

## 2023-06-20 PROCEDURE — G0103 PSA SCREENING: HCPCS

## 2023-06-27 DIAGNOSIS — R97.20 ELEVATED PSA: Primary | ICD-10-CM

## 2023-07-12 NOTE — TELEPHONE ENCOUNTER
----- Message from Carolyn Anderson sent at 12/3/2021  3:05 PM EST -----  Subject: Refill Request    QUESTIONS  Name of Medication? lisinopril-hydroCHLOROthiazide (PRINZIDE;ZESTORETIC)   10-12.5 MG per tablet  Patient-reported dosage and instructions? one a day   How many days do you have left? 0  Preferred Pharmacy? [facility]  Pharmacy phone number (if available)? [phone]  Additional Information for Provider? Pt didn't realize that he didn't have   a follow up scheduled. We have him scheduled for 12/13 but he is currently   out of medication. Can you please fill in the meantime?   ---------------------------------------------------------------------------  --------------,  Name of Medication? atorvastatin (LIPITOR) 40 MG tablet  Patient-reported dosage and instructions? one a day  How many days do you have left? 0  Preferred Pharmacy? [facility]  Pharmacy phone number (if available)? [phone]  Additional Information for Provider? Pt didn't realize that he didn't have   a follow up scheduled. We have him scheduled for 12/13 but he is currently   out of medication. Can you please fill in the meantime?   ---------------------------------------------------------------------------  --------------  CALL BACK INFO  What is the best way for the office to contact you? OK to leave message on   voicemail  Preferred Call Back Phone Number?  [phone] [facility]ites

## 2023-07-13 ENCOUNTER — PROCEDURE VISIT (OUTPATIENT)
Dept: CARDIOLOGY CLINIC | Age: 63
End: 2023-07-13

## 2023-07-13 DIAGNOSIS — R01.1 MURMUR, CARDIAC: Primary | ICD-10-CM

## 2023-07-13 DIAGNOSIS — Q23.1 BICUSPID AORTIC VALVE: ICD-10-CM

## 2023-07-13 LAB
LV EF: 58 %
LVEF MODALITY: NORMAL

## 2023-07-18 ENCOUNTER — TELEPHONE (OUTPATIENT)
Dept: CARDIOLOGY CLINIC | Age: 63
End: 2023-07-18

## 2023-07-18 NOTE — TELEPHONE ENCOUNTER
Called to patient the results of his recent echo. EF 55-60%. Dilatation of the aortic root measuring 4.4 cm. Dilatation of the ascending aorta measuring 4.8 cm. Moderate aortic stenosis with mean gradient of 30 mmHg. Doppler evaluation reveals mild aortic, mitral, and tricuspid regurgitation  Patient verbalized understanding of all information given.

## 2023-08-24 ENCOUNTER — OFFICE VISIT (OUTPATIENT)
Dept: CARDIOLOGY CLINIC | Age: 63
End: 2023-08-24
Payer: COMMERCIAL

## 2023-08-24 VITALS
WEIGHT: 220 LBS | HEART RATE: 74 BPM | HEIGHT: 70 IN | SYSTOLIC BLOOD PRESSURE: 120 MMHG | DIASTOLIC BLOOD PRESSURE: 76 MMHG | BODY MASS INDEX: 31.5 KG/M2

## 2023-08-24 DIAGNOSIS — E78.5 DYSLIPIDEMIA: ICD-10-CM

## 2023-08-24 DIAGNOSIS — R01.1 MURMUR, CARDIAC: Primary | ICD-10-CM

## 2023-08-24 DIAGNOSIS — I35.0 NONRHEUMATIC AORTIC VALVE STENOSIS: ICD-10-CM

## 2023-08-24 DIAGNOSIS — I77.810 AORTIC ROOT DILATION (HCC): ICD-10-CM

## 2023-08-24 DIAGNOSIS — I10 ESSENTIAL HYPERTENSION: ICD-10-CM

## 2023-08-24 PROCEDURE — 93000 ELECTROCARDIOGRAM COMPLETE: CPT | Performed by: INTERNAL MEDICINE

## 2023-08-24 PROCEDURE — 3074F SYST BP LT 130 MM HG: CPT | Performed by: INTERNAL MEDICINE

## 2023-08-24 PROCEDURE — 99214 OFFICE O/P EST MOD 30 MIN: CPT | Performed by: INTERNAL MEDICINE

## 2023-08-24 PROCEDURE — 3078F DIAST BP <80 MM HG: CPT | Performed by: INTERNAL MEDICINE

## 2023-08-24 RX ORDER — CIPROFLOXACIN 500 MG/1
TABLET, FILM COATED ORAL
COMMUNITY
Start: 2023-08-22

## 2023-08-24 NOTE — PROGRESS NOTES
Purnima Paiz MD                                  CARDIOLOGY  NOTE           Chief Complaint:    Chief Complaint   Patient presents with    1 Year Follow Up     Patient denies any cardiac symptoms such as SOB, dizziness, chest pain, palpitations, or swelling      ECHO  2023     Left ventricular systolic function is normal with an ejection fraction of   55-60%. Dilatation of the aortic root measuring 4.4 cm. Dilatation of the ascending aorta measuring 4.8 cm. Moderate aortic stenosis with mean gradient of 30 mmHg. Doppler evaluation reveals mild aortic, mitral, and tricuspid   regurgitation. Normal pulmonary artery pressure with a RVSP of 27 mmHg. No evidence of pericardial effusion. HPI:     Biju Case is a 66y.o. year old male who presents to the hospital to be evaluated for valvular heart disease  Patient is known to have \" cardiac murmur \" for years. He recently underwent echocardiogram which revealed possible bicuspid aortic valve with moderate aortic stenosis    Patient denies any symptoms of chest pain dyspnea or palpitations  Patient denies any history of syncope presyncope  Patient lives a rather sedentary lifestyle    Patient has prior medical history significant for hypertension and hyperlipidemia for which she is on medication and is compliant. Echocardiogram        Summary   Left ventricular systolic function is normal.   Ejection fraction is visually estimated at 55%. Possible bicuspid aortic valve with fusion of the [place] and LCC and   underdeveloped RCC. Aortic valve appears heavily calcified with moderate   stenosis; mean P mmHg, JADEN: 1.12 cm sq, DVI: 0.3. Trace aortic regurgitation is noted. No evidence of any pericardial effusion. Dilated aortic root (4.6 cm) and ascending aorta (4.82 cm).       Signature      ------------------------------------------------------------------   Electronically signed by Kari Lopez MD   (Interpreting

## 2023-11-12 NOTE — PROGRESS NOTES
dexaPatient Name:  Cloteal Gum  Patient :  1960  Patient MRN:  [id number]     Primary Oncologist: Alyx Michaud MD  Referring Provider: Thai Huang DO     Date of Service: 2023     Chief Complaint:    Chief Complaint   Patient presents with    Follow-up     He came in for follow-up visit. Patient Active Problem List:     Hyperlipidemia     Impotence of organic origin     Essential hypertension     Ecchymosis     HPI:   Lance Howe is a pleasant 59-year-old male patient who was referred for evaluation of leukopenia and thrombocytopenia. In 2015 WBC was 9.93, hemoglobin 14.1, platelet 412. On 2021 WBC was 1.6, hemoglobin 14.5, hematocrit 42, MCV 81, platelet 13. CMP was grossly unremarkable. Peripheral blood smear and histogram are reviewed. Leukopenia is  present with absolute neutropenia. Granulocytes display occasional hypogranularity. Definitive circulating blasts are not identified. There is no evidence of anemia and red blood cells display normal morphology. Platelets are decreased without evidence of clumping. Clinical correlation is recommended. If no etiology is identified for the patient's cytopenias, hematology consultation should be considered with possible bone marrow biopsy to exclude myeloid disorder/neoplasm. He denied any recent infection. He denied any epistaxis or gums bleed. He denied any excessive bleeding. He had vaccine for COVID-19 in April and May 2021. After the vaccine he had quite often easy bruising. Also he reported that he had blood in the tissue when he blow his nose intermittently after the vaccine. He is aware about his heart murmur which he has for more than 10 years. I discussed with him the work-up further leukopenia and or thrombocytopenia. I doubted that he has TTP. I reviewed his medication list.  He denied taking any herbal medication. No tonic water. He drinks 4-5 beers weekly.   Cologuard in  was

## 2023-12-04 ENCOUNTER — HOSPITAL ENCOUNTER (OUTPATIENT)
Dept: INFUSION THERAPY | Age: 63
Discharge: HOME | End: 2023-12-04
Payer: COMMERCIAL

## 2023-12-04 ENCOUNTER — OFFICE VISIT (OUTPATIENT)
Dept: ONCOLOGY | Age: 63
End: 2023-12-04
Payer: COMMERCIAL

## 2023-12-04 VITALS
OXYGEN SATURATION: 96 % | WEIGHT: 225.8 LBS | HEIGHT: 70 IN | HEART RATE: 83 BPM | DIASTOLIC BLOOD PRESSURE: 84 MMHG | SYSTOLIC BLOOD PRESSURE: 143 MMHG | BODY MASS INDEX: 32.33 KG/M2 | TEMPERATURE: 98 F

## 2023-12-04 DIAGNOSIS — D69.6 THROMBOCYTOPENIA (HCC): ICD-10-CM

## 2023-12-04 DIAGNOSIS — D69.6 THROMBOCYTOPENIA (HCC): Primary | ICD-10-CM

## 2023-12-04 LAB
ALBUMIN SERPL-MCNC: 4.8 GM/DL (ref 3.4–5)
ALP BLD-CCNC: 82 IU/L (ref 40–129)
ALT SERPL-CCNC: 25 U/L (ref 10–40)
ANION GAP SERPL CALCULATED.3IONS-SCNC: 14 MMOL/L (ref 4–16)
AST SERPL-CCNC: 20 IU/L (ref 15–37)
BASOPHILS ABSOLUTE: 0 K/CU MM
BASOPHILS RELATIVE PERCENT: 0.2 % (ref 0–1)
BILIRUB SERPL-MCNC: 0.4 MG/DL (ref 0–1)
BUN SERPL-MCNC: 15 MG/DL (ref 6–23)
CALCIUM SERPL-MCNC: 9.3 MG/DL (ref 8.3–10.6)
CHLORIDE BLD-SCNC: 102 MMOL/L (ref 99–110)
CO2: 25 MMOL/L (ref 21–32)
CREAT SERPL-MCNC: 1 MG/DL (ref 0.9–1.3)
DIFFERENTIAL TYPE: ABNORMAL
EOSINOPHILS ABSOLUTE: 0 K/CU MM
EOSINOPHILS RELATIVE PERCENT: 0.2 % (ref 0–3)
GFR SERPL CREATININE-BSD FRML MDRD: >60 ML/MIN/1.73M2
GLUCOSE SERPL-MCNC: 103 MG/DL (ref 70–99)
HCT VFR BLD CALC: 43.8 % (ref 42–52)
HEMOGLOBIN: 15 GM/DL (ref 13.5–18)
LACTATE DEHYDROGENASE: 177 IU/L (ref 120–246)
LYMPHOCYTES ABSOLUTE: 1.4 K/CU MM
LYMPHOCYTES RELATIVE PERCENT: 23.4 % (ref 24–44)
MCH RBC QN AUTO: 28.6 PG (ref 27–31)
MCHC RBC AUTO-ENTMCNC: 34.2 % (ref 32–36)
MCV RBC AUTO: 83.4 FL (ref 78–100)
MONOCYTES ABSOLUTE: 0.6 K/CU MM
MONOCYTES RELATIVE PERCENT: 9.8 % (ref 0–4)
PDW BLD-RTO: 13.3 % (ref 11.7–14.9)
PLATELET # BLD: 96 K/CU MM (ref 140–440)
PMV BLD AUTO: 11 FL (ref 7.5–11.1)
POTASSIUM SERPL-SCNC: 3.7 MMOL/L (ref 3.5–5.1)
RBC # BLD: 5.25 M/CU MM (ref 4.6–6.2)
SEGMENTED NEUTROPHILS ABSOLUTE COUNT: 4 K/CU MM
SEGMENTED NEUTROPHILS RELATIVE PERCENT: 66.4 % (ref 36–66)
SODIUM BLD-SCNC: 141 MMOL/L (ref 135–145)
TOTAL PROTEIN: 7.3 GM/DL (ref 6.4–8.2)
WBC # BLD: 6 K/CU MM (ref 4–10.5)

## 2023-12-04 PROCEDURE — 83615 LACTATE (LD) (LDH) ENZYME: CPT

## 2023-12-04 PROCEDURE — 99211 OFF/OP EST MAY X REQ PHY/QHP: CPT

## 2023-12-04 PROCEDURE — 99213 OFFICE O/P EST LOW 20 MIN: CPT | Performed by: INTERNAL MEDICINE

## 2023-12-04 PROCEDURE — 3079F DIAST BP 80-89 MM HG: CPT | Performed by: INTERNAL MEDICINE

## 2023-12-04 PROCEDURE — 3077F SYST BP >= 140 MM HG: CPT | Performed by: INTERNAL MEDICINE

## 2023-12-04 PROCEDURE — 80053 COMPREHEN METABOLIC PANEL: CPT

## 2023-12-04 PROCEDURE — 85025 COMPLETE CBC W/AUTO DIFF WBC: CPT

## 2023-12-04 PROCEDURE — 36415 COLL VENOUS BLD VENIPUNCTURE: CPT

## 2023-12-04 ASSESSMENT — PATIENT HEALTH QUESTIONNAIRE - PHQ9
2. FEELING DOWN, DEPRESSED OR HOPELESS: 0
SUM OF ALL RESPONSES TO PHQ QUESTIONS 1-9: 0
SUM OF ALL RESPONSES TO PHQ9 QUESTIONS 1 & 2: 0
SUM OF ALL RESPONSES TO PHQ QUESTIONS 1-9: 0
1. LITTLE INTEREST OR PLEASURE IN DOING THINGS: 0

## 2023-12-04 NOTE — PROGRESS NOTES
MA Rooming Questions  Patient: Radha Rojo  MRN: [id number]    Date: 12/4/2023        1. Do you have any new issues?   no         2. Do you need any refills on medications?    no    3. Have you had any imaging done since your last visit?   no    4. Have you been hospitalized or seen in the emergency room since your last visit here?   no    5. Did the patient have a depression screening completed today?  Yes    PHQ-9 Total Score: 0 (12/4/2023  2:59 PM)       PHQ-9 Given to (if applicable):               PHQ-9 Score (if applicable):                     [] Positive     [x]  Negative              Does question #9 need addressed (if applicable)                     [] Yes    []  No               Jennifer Raya CMA

## 2023-12-11 DIAGNOSIS — E78.5 HYPERLIPIDEMIA, UNSPECIFIED HYPERLIPIDEMIA TYPE: ICD-10-CM

## 2023-12-11 DIAGNOSIS — I10 ESSENTIAL HYPERTENSION: ICD-10-CM

## 2023-12-12 DIAGNOSIS — I10 ESSENTIAL HYPERTENSION: ICD-10-CM

## 2023-12-12 DIAGNOSIS — E78.5 HYPERLIPIDEMIA, UNSPECIFIED HYPERLIPIDEMIA TYPE: ICD-10-CM

## 2023-12-12 RX ORDER — LISINOPRIL AND HYDROCHLOROTHIAZIDE 12.5; 1 MG/1; MG/1
1 TABLET ORAL DAILY
Qty: 30 TABLET | Refills: 5 | OUTPATIENT
Start: 2023-12-12

## 2023-12-12 RX ORDER — ATORVASTATIN CALCIUM 40 MG/1
40 TABLET, FILM COATED ORAL DAILY
Qty: 30 TABLET | Refills: 5 | Status: SHIPPED | OUTPATIENT
Start: 2023-12-12

## 2023-12-12 RX ORDER — ATORVASTATIN CALCIUM 40 MG/1
40 TABLET, FILM COATED ORAL DAILY
Qty: 30 TABLET | Refills: 5 | OUTPATIENT
Start: 2023-12-12

## 2023-12-12 RX ORDER — LISINOPRIL AND HYDROCHLOROTHIAZIDE 12.5; 1 MG/1; MG/1
1 TABLET ORAL DAILY
Qty: 30 TABLET | Refills: 5 | Status: SHIPPED | OUTPATIENT
Start: 2023-12-12

## 2024-01-11 ENCOUNTER — OFFICE VISIT (OUTPATIENT)
Dept: FAMILY MEDICINE CLINIC | Age: 64
End: 2024-01-11
Payer: COMMERCIAL

## 2024-01-11 VITALS
HEART RATE: 81 BPM | OXYGEN SATURATION: 97 % | SYSTOLIC BLOOD PRESSURE: 120 MMHG | DIASTOLIC BLOOD PRESSURE: 72 MMHG | HEIGHT: 70 IN | BODY MASS INDEX: 31.84 KG/M2 | WEIGHT: 222.4 LBS

## 2024-01-11 DIAGNOSIS — I10 ESSENTIAL HYPERTENSION: Primary | ICD-10-CM

## 2024-01-11 DIAGNOSIS — E66.09 CLASS 1 OBESITY DUE TO EXCESS CALORIES WITHOUT SERIOUS COMORBIDITY WITH BODY MASS INDEX (BMI) OF 32.0 TO 32.9 IN ADULT: ICD-10-CM

## 2024-01-11 DIAGNOSIS — Z23 ENCOUNTER FOR IMMUNIZATION: ICD-10-CM

## 2024-01-11 DIAGNOSIS — N40.0 BENIGN PROSTATIC HYPERPLASIA, UNSPECIFIED WHETHER LOWER URINARY TRACT SYMPTOMS PRESENT: ICD-10-CM

## 2024-01-11 DIAGNOSIS — K76.0 FATTY LIVER: ICD-10-CM

## 2024-01-11 DIAGNOSIS — E78.5 DYSLIPIDEMIA: ICD-10-CM

## 2024-01-11 DIAGNOSIS — D69.6 THROMBOCYTOPENIA (HCC): ICD-10-CM

## 2024-01-11 PROCEDURE — G8482 FLU IMMUNIZE ORDER/ADMIN: HCPCS | Performed by: STUDENT IN AN ORGANIZED HEALTH CARE EDUCATION/TRAINING PROGRAM

## 2024-01-11 PROCEDURE — 3017F COLORECTAL CA SCREEN DOC REV: CPT | Performed by: STUDENT IN AN ORGANIZED HEALTH CARE EDUCATION/TRAINING PROGRAM

## 2024-01-11 PROCEDURE — 1036F TOBACCO NON-USER: CPT | Performed by: STUDENT IN AN ORGANIZED HEALTH CARE EDUCATION/TRAINING PROGRAM

## 2024-01-11 PROCEDURE — 90471 IMMUNIZATION ADMIN: CPT | Performed by: STUDENT IN AN ORGANIZED HEALTH CARE EDUCATION/TRAINING PROGRAM

## 2024-01-11 PROCEDURE — 3078F DIAST BP <80 MM HG: CPT | Performed by: STUDENT IN AN ORGANIZED HEALTH CARE EDUCATION/TRAINING PROGRAM

## 2024-01-11 PROCEDURE — 90674 CCIIV4 VAC NO PRSV 0.5 ML IM: CPT | Performed by: STUDENT IN AN ORGANIZED HEALTH CARE EDUCATION/TRAINING PROGRAM

## 2024-01-11 PROCEDURE — 99214 OFFICE O/P EST MOD 30 MIN: CPT | Performed by: STUDENT IN AN ORGANIZED HEALTH CARE EDUCATION/TRAINING PROGRAM

## 2024-01-11 PROCEDURE — G8417 CALC BMI ABV UP PARAM F/U: HCPCS | Performed by: STUDENT IN AN ORGANIZED HEALTH CARE EDUCATION/TRAINING PROGRAM

## 2024-01-11 PROCEDURE — 3074F SYST BP LT 130 MM HG: CPT | Performed by: STUDENT IN AN ORGANIZED HEALTH CARE EDUCATION/TRAINING PROGRAM

## 2024-01-11 PROCEDURE — G8427 DOCREV CUR MEDS BY ELIG CLIN: HCPCS | Performed by: STUDENT IN AN ORGANIZED HEALTH CARE EDUCATION/TRAINING PROGRAM

## 2024-01-11 NOTE — PROGRESS NOTES
1/16/2024    Pilo Seymour    Chief Complaint   Patient presents with    6 Month Follow-Up     HTN, BPH       HPI  History was obtained from patient.  Pilo is a 63 y.o. male with a PMHx as listed below who presents today for 6 month follow up on chronic conditions.     Bicuspid aorticv valve follows Dr. Licona annual echo  Elevated psa follows dr dennis  Fatty liveradmits ETOH use  Blood in stool    Chronic ITP follows with Dr. Neal    He deos not want to follow with Dr. Dennis    He feels his medical condition is due to covid vaccine    ETOH denies daily use 4-5 beers, declines GI referral some fatty liver enzymes have improved    16 pound weight loss through diet/exercise    1. Essential hypertension    2. Encounter for immunization    3. Thrombocytopenia (HCC)    4. Fatty liver    5. Class 1 obesity due to excess calories without serious comorbidity with body mass index (BMI) of 32.0 to 32.9 in adult    6. Dyslipidemia    7. Benign prostatic hyperplasia, unspecified whether lower urinary tract symptoms present             REVIEW OF SYMPTOMS    Review of Systems   Constitutional:  Negative for chills and fatigue.   HENT:  Negative for congestion and sore throat.    Respiratory:  Negative for shortness of breath and wheezing.    Cardiovascular:  Negative for chest pain and palpitations.   Gastrointestinal:  Negative for abdominal pain and nausea.   Genitourinary:  Negative for frequency and urgency.   Neurological:  Negative for light-headedness.       PAST MEDICAL HISTORY  Past Medical History:   Diagnosis Date    H/O echocardiogram 07/13/2023    EF 55-60%. Dilatation of the aortic root measuring 4.4 cm. Dilatation of the ascending aorta measuring 4.8 cm. Moderate aortic stenosis with mean gradient of 30 mmHg. Doppler evaluation reveals mild aortic, mitral, and tricuspid regurgitation    Hyperlipidemia     Impotence of organic origin        FAMILY HISTORY  Family History   Problem Relation Age of Onset

## 2024-01-16 ASSESSMENT — ENCOUNTER SYMPTOMS
SORE THROAT: 0
WHEEZING: 0
SHORTNESS OF BREATH: 0
NAUSEA: 0
ABDOMINAL PAIN: 0

## 2024-05-17 NOTE — PROGRESS NOTES
dexaPatient Name:  Pilo Seymour  Patient :  1960  Patient MRN:  [id number]     Primary Oncologist: Val Hodges MD  Referring Provider: Tate Goodman DO     Date of Service: 2024     Chief Complaint:    Chief Complaint   Patient presents with    Follow-up     He came in for follow-up visit.     Patient Active Problem List:     Hyperlipidemia     Impotence of organic origin     Essential hypertension     Ecchymosis     HPI:   Pilo Seymour is a pleasant 64-year-old male patient who was referred for evaluation of leukopenia and thrombocytopenia.   2015 WBC 9.93, hemoglobin 14.1, platelet 261.  2021 WBC 1.6, hemoglobin 14.5, hematocrit 42, MCV 81, platelet 13. CMP grossly unremarkable.  Peripheral blood smear and histogram are reviewed. Leukopenia is  present with absolute neutropenia. Granulocytes display occasional hypogranularity.  Definitive circulating blasts are not identified.   There is no evidence of anemia and red blood cells display normal morphology.  Platelets are decreased without evidence of clumping.   Clinical correlation is recommended.  If no etiology is identified for the patient's cytopenias, hematology consultation should be considered with possible bone marrow biopsy to exclude myeloid disorder/neoplasm.   He denied any recent infection.  He denied any epistaxis or gums bleed.  He denied any excessive bleeding.  He had vaccine for COVID-19 in April and May 2021.  After the vaccine he had quite often easy bruising.  Also he reported that he had blood in the tissue when he blow his nose intermittently after the vaccine.  He is aware about his heart murmur which he has for more than 10 years.  I discussed with him the work-up further leukopenia and or thrombocytopenia.  I doubted that he has TTP. I reviewed his medication list.  He denied taking any herbal medication.  No tonic water.  He drinks 4-5 beers weekly.  Cologuard in  was negative.    2022 Bone

## 2024-05-31 ENCOUNTER — COMMUNITY OUTREACH (OUTPATIENT)
Dept: FAMILY MEDICINE CLINIC | Age: 64
End: 2024-05-31

## 2024-06-06 ENCOUNTER — CLINICAL DOCUMENTATION (OUTPATIENT)
Dept: ONCOLOGY | Age: 64
End: 2024-06-06

## 2024-06-06 ENCOUNTER — HOSPITAL ENCOUNTER (OUTPATIENT)
Dept: INFUSION THERAPY | Age: 64
Discharge: HOME | End: 2024-06-06
Payer: COMMERCIAL

## 2024-06-06 DIAGNOSIS — D69.6 THROMBOCYTOPENIA (HCC): ICD-10-CM

## 2024-06-06 LAB
ALBUMIN SERPL-MCNC: 4.7 GM/DL (ref 3.4–5)
ALP BLD-CCNC: 85 IU/L (ref 40–128)
ALT SERPL-CCNC: 22 U/L (ref 10–40)
ANION GAP SERPL CALCULATED.3IONS-SCNC: 13 MMOL/L (ref 7–16)
AST SERPL-CCNC: 19 IU/L (ref 15–37)
BASOPHILS ABSOLUTE: 0 K/CU MM
BASOPHILS RELATIVE PERCENT: 0.2 % (ref 0–1)
BILIRUB SERPL-MCNC: 0.4 MG/DL (ref 0–1)
BUN SERPL-MCNC: 17 MG/DL (ref 6–23)
CALCIUM SERPL-MCNC: 9 MG/DL (ref 8.3–10.6)
CHLORIDE BLD-SCNC: 102 MMOL/L (ref 99–110)
CO2: 23 MMOL/L (ref 21–32)
CREAT SERPL-MCNC: 1 MG/DL (ref 0.9–1.3)
DIFFERENTIAL TYPE: ABNORMAL
EOSINOPHILS ABSOLUTE: 0 K/CU MM
EOSINOPHILS RELATIVE PERCENT: 0.2 % (ref 0–3)
GFR, ESTIMATED: 85 ML/MIN/1.73M2
GLUCOSE SERPL-MCNC: 97 MG/DL (ref 70–99)
HCT VFR BLD CALC: 40.9 % (ref 42–52)
HEMOGLOBIN: 14 GM/DL (ref 13.5–18)
LYMPHOCYTES ABSOLUTE: 1.1 K/CU MM
LYMPHOCYTES RELATIVE PERCENT: 19.3 % (ref 24–44)
MCH RBC QN AUTO: 27.6 PG (ref 27–31)
MCHC RBC AUTO-ENTMCNC: 34.2 % (ref 32–36)
MCV RBC AUTO: 80.5 FL (ref 78–100)
MONOCYTES ABSOLUTE: 0.5 K/CU MM
MONOCYTES RELATIVE PERCENT: 9.2 % (ref 0–4)
NEUTROPHILS ABSOLUTE: 4 K/CU MM
NEUTROPHILS RELATIVE PERCENT: 71.1 % (ref 36–66)
PDW BLD-RTO: 13.9 % (ref 11.7–14.9)
PLATELET # BLD: 14 K/CU MM (ref 140–440)
PLT MORPHOLOGY: ABNORMAL
PMV BLD AUTO: 12.2 FL (ref 7.5–11.1)
POTASSIUM SERPL-SCNC: 3.5 MMOL/L (ref 3.5–5.1)
RBC # BLD: 5.08 M/CU MM (ref 4.6–6.2)
SODIUM BLD-SCNC: 138 MMOL/L (ref 135–145)
TOTAL PROTEIN: 7.5 GM/DL (ref 6.4–8.2)
WBC # BLD: 5.6 K/CU MM (ref 4–10.5)

## 2024-06-06 PROCEDURE — 80053 COMPREHEN METABOLIC PANEL: CPT

## 2024-06-06 PROCEDURE — 36415 COLL VENOUS BLD VENIPUNCTURE: CPT

## 2024-06-06 PROCEDURE — 85025 COMPLETE CBC W/AUTO DIFF WBC: CPT

## 2024-06-06 NOTE — PROGRESS NOTES
Patient returned this RN's call. Patient states that he has noticed some bruising over the past week, but is not concerned because he knows what caused the bruising. No significant bruising or bleeding noted per patient. Advised that if patient becomes symptomatic, he should go to ER; otherwise, patient will come to clinic for repeat CBC tomorrow 06/07/2024 @ 1430. Order for CBC placed. Patient would like to hold off on RX for dexamethasone and PPI at this time until labs completed. No further needs addressed.

## 2024-06-06 NOTE — PROGRESS NOTES
Lab results reviewed from today 06/06/2024. PLT 14. Physician requesting that this RN call patient to inquire about any increased bleeding or bruising. If so, patient should go to ER. If not, patient needs to come to clinic tomorrow 06/07/2024 for repeat CBC. Physician advised that we can also offer dexamethasone 40 mg daily for 5 days. Attempted to call patient @ [phone] to address physician concerns; however, no answer. VM left with this RN direct number requesting call back.

## 2024-06-07 ENCOUNTER — HOSPITAL ENCOUNTER (OUTPATIENT)
Dept: INFUSION THERAPY | Age: 64
Discharge: HOME | End: 2024-06-07
Payer: COMMERCIAL

## 2024-06-07 DIAGNOSIS — D69.6 THROMBOCYTOPENIA (HCC): ICD-10-CM

## 2024-06-07 LAB
ALBUMIN SERPL-MCNC: 4.5 GM/DL (ref 3.4–5)
ALP BLD-CCNC: 84 IU/L (ref 40–129)
ALT SERPL-CCNC: 22 U/L (ref 10–40)
ANION GAP SERPL CALCULATED.3IONS-SCNC: 13 MMOL/L (ref 7–16)
AST SERPL-CCNC: 18 IU/L (ref 15–37)
BASOPHILS ABSOLUTE: 0 K/CU MM
BASOPHILS RELATIVE PERCENT: 0.2 % (ref 0–1)
BILIRUB SERPL-MCNC: 0.4 MG/DL (ref 0–1)
BUN SERPL-MCNC: 18 MG/DL (ref 6–23)
CALCIUM SERPL-MCNC: 9.1 MG/DL (ref 8.3–10.6)
CHLORIDE BLD-SCNC: 100 MMOL/L (ref 99–110)
CO2: 24 MMOL/L (ref 21–32)
CREAT SERPL-MCNC: 0.9 MG/DL (ref 0.9–1.3)
DIFFERENTIAL TYPE: ABNORMAL
EOSINOPHILS ABSOLUTE: 0 K/CU MM
EOSINOPHILS RELATIVE PERCENT: 0.2 % (ref 0–3)
GFR, ESTIMATED: >90 ML/MIN/1.73M2
GLUCOSE SERPL-MCNC: 102 MG/DL (ref 70–99)
HCT VFR BLD CALC: 40.9 % (ref 42–52)
HEMOGLOBIN: 14 GM/DL (ref 13.5–18)
LYMPHOCYTES ABSOLUTE: 1.3 K/CU MM
LYMPHOCYTES RELATIVE PERCENT: 27.2 % (ref 24–44)
MCH RBC QN AUTO: 27.8 PG (ref 27–31)
MCHC RBC AUTO-ENTMCNC: 34.2 % (ref 32–36)
MCV RBC AUTO: 81.2 FL (ref 78–100)
MONOCYTES ABSOLUTE: 0.5 K/CU MM
MONOCYTES RELATIVE PERCENT: 9.5 % (ref 0–4)
NEUTROPHILS ABSOLUTE: 3 K/CU MM
NEUTROPHILS RELATIVE PERCENT: 62.9 % (ref 36–66)
PDW BLD-RTO: 13.9 % (ref 11.7–14.9)
PLATELET # BLD: 48 K/CU MM (ref 140–440)
PMV BLD AUTO: 11.6 FL (ref 7.5–11.1)
POTASSIUM SERPL-SCNC: 3.5 MMOL/L (ref 3.5–5.1)
RBC # BLD: 5.04 M/CU MM (ref 4.6–6.2)
SODIUM BLD-SCNC: 137 MMOL/L (ref 135–145)
TOTAL PROTEIN: 7 GM/DL (ref 6.4–8.2)
WBC # BLD: 4.8 K/CU MM (ref 4–10.5)

## 2024-06-07 PROCEDURE — 36415 COLL VENOUS BLD VENIPUNCTURE: CPT

## 2024-06-07 PROCEDURE — 85025 COMPLETE CBC W/AUTO DIFF WBC: CPT

## 2024-06-07 PROCEDURE — 80053 COMPREHEN METABOLIC PANEL: CPT

## 2024-06-13 ENCOUNTER — OFFICE VISIT (OUTPATIENT)
Dept: ONCOLOGY | Age: 64
End: 2024-06-13
Payer: COMMERCIAL

## 2024-06-13 ENCOUNTER — HOSPITAL ENCOUNTER (OUTPATIENT)
Dept: INFUSION THERAPY | Age: 64
Discharge: HOME | End: 2024-06-13
Payer: COMMERCIAL

## 2024-06-13 VITALS
WEIGHT: 227.8 LBS | TEMPERATURE: 98.3 F | OXYGEN SATURATION: 98 % | BODY MASS INDEX: 32.61 KG/M2 | HEIGHT: 70 IN | RESPIRATION RATE: 16 BRPM | DIASTOLIC BLOOD PRESSURE: 69 MMHG | SYSTOLIC BLOOD PRESSURE: 109 MMHG | HEART RATE: 87 BPM

## 2024-06-13 DIAGNOSIS — I10 ESSENTIAL HYPERTENSION: ICD-10-CM

## 2024-06-13 DIAGNOSIS — E78.5 HYPERLIPIDEMIA, UNSPECIFIED HYPERLIPIDEMIA TYPE: ICD-10-CM

## 2024-06-13 DIAGNOSIS — D69.6 THROMBOCYTOPENIA (HCC): Primary | ICD-10-CM

## 2024-06-13 DIAGNOSIS — D69.6 THROMBOCYTOPENIA (HCC): ICD-10-CM

## 2024-06-13 LAB
ALBUMIN SERPL-MCNC: 4.7 GM/DL (ref 3.4–5)
ALP BLD-CCNC: 78 IU/L (ref 40–129)
ALT SERPL-CCNC: 27 U/L (ref 10–40)
ANION GAP SERPL CALCULATED.3IONS-SCNC: 14 MMOL/L (ref 7–16)
AST SERPL-CCNC: 22 IU/L (ref 15–37)
BASOPHILS ABSOLUTE: 0 K/CU MM
BASOPHILS RELATIVE PERCENT: 0.4 % (ref 0–1)
BILIRUB SERPL-MCNC: 0.4 MG/DL (ref 0–1)
BUN SERPL-MCNC: 15 MG/DL (ref 6–23)
CALCIUM SERPL-MCNC: 9.7 MG/DL (ref 8.3–10.6)
CHLORIDE BLD-SCNC: 100 MMOL/L (ref 99–110)
CO2: 24 MMOL/L (ref 21–32)
CREAT SERPL-MCNC: 0.9 MG/DL (ref 0.9–1.3)
DIFFERENTIAL TYPE: ABNORMAL
EOSINOPHILS ABSOLUTE: 0 K/CU MM
EOSINOPHILS RELATIVE PERCENT: 0.2 % (ref 0–3)
GFR, ESTIMATED: >90 ML/MIN/1.73M2
GLUCOSE SERPL-MCNC: 95 MG/DL (ref 70–99)
HCT VFR BLD CALC: 40.2 % (ref 42–52)
HEMOGLOBIN: 13.8 GM/DL (ref 13.5–18)
LACTATE DEHYDROGENASE: 166 IU/L (ref 120–246)
LYMPHOCYTES ABSOLUTE: 1.1 K/CU MM
LYMPHOCYTES RELATIVE PERCENT: 20.1 % (ref 24–44)
MCH RBC QN AUTO: 27.7 PG (ref 27–31)
MCHC RBC AUTO-ENTMCNC: 34.3 % (ref 32–36)
MCV RBC AUTO: 80.7 FL (ref 78–100)
MONOCYTES ABSOLUTE: 0.5 K/CU MM
MONOCYTES RELATIVE PERCENT: 9 % (ref 0–4)
NEUTROPHILS ABSOLUTE: 3.8 K/CU MM
NEUTROPHILS RELATIVE PERCENT: 70.3 % (ref 36–66)
PDW BLD-RTO: 14.2 % (ref 11.7–14.9)
PLATELET # BLD: 94 K/CU MM (ref 140–440)
PMV BLD AUTO: 10.5 FL (ref 7.5–11.1)
POTASSIUM SERPL-SCNC: 3.7 MMOL/L (ref 3.5–5.1)
RBC # BLD: 4.98 M/CU MM (ref 4.6–6.2)
SODIUM BLD-SCNC: 138 MMOL/L (ref 135–145)
TOTAL PROTEIN: 7.1 GM/DL (ref 6.4–8.2)
WBC # BLD: 5.4 K/CU MM (ref 4–10.5)

## 2024-06-13 PROCEDURE — G8427 DOCREV CUR MEDS BY ELIG CLIN: HCPCS | Performed by: INTERNAL MEDICINE

## 2024-06-13 PROCEDURE — 83615 LACTATE (LD) (LDH) ENZYME: CPT

## 2024-06-13 PROCEDURE — 3078F DIAST BP <80 MM HG: CPT | Performed by: INTERNAL MEDICINE

## 2024-06-13 PROCEDURE — 36415 COLL VENOUS BLD VENIPUNCTURE: CPT

## 2024-06-13 PROCEDURE — 80053 COMPREHEN METABOLIC PANEL: CPT

## 2024-06-13 PROCEDURE — 99211 OFF/OP EST MAY X REQ PHY/QHP: CPT

## 2024-06-13 PROCEDURE — 1036F TOBACCO NON-USER: CPT | Performed by: INTERNAL MEDICINE

## 2024-06-13 PROCEDURE — G8417 CALC BMI ABV UP PARAM F/U: HCPCS | Performed by: INTERNAL MEDICINE

## 2024-06-13 PROCEDURE — 3017F COLORECTAL CA SCREEN DOC REV: CPT | Performed by: INTERNAL MEDICINE

## 2024-06-13 PROCEDURE — 85025 COMPLETE CBC W/AUTO DIFF WBC: CPT

## 2024-06-13 PROCEDURE — 99213 OFFICE O/P EST LOW 20 MIN: CPT | Performed by: INTERNAL MEDICINE

## 2024-06-13 PROCEDURE — 3074F SYST BP LT 130 MM HG: CPT | Performed by: INTERNAL MEDICINE

## 2024-06-13 RX ORDER — LISINOPRIL AND HYDROCHLOROTHIAZIDE 12.5; 1 MG/1; MG/1
1 TABLET ORAL DAILY
Qty: 30 TABLET | Refills: 5 | Status: SHIPPED | OUTPATIENT
Start: 2024-06-13

## 2024-06-13 RX ORDER — ATORVASTATIN CALCIUM 40 MG/1
40 TABLET, FILM COATED ORAL DAILY
Qty: 30 TABLET | Refills: 5 | Status: SHIPPED | OUTPATIENT
Start: 2024-06-13

## 2024-06-13 ASSESSMENT — PATIENT HEALTH QUESTIONNAIRE - PHQ9
2. FEELING DOWN, DEPRESSED OR HOPELESS: NOT AT ALL
1. LITTLE INTEREST OR PLEASURE IN DOING THINGS: NOT AT ALL
SUM OF ALL RESPONSES TO PHQ9 QUESTIONS 1 & 2: 0
SUM OF ALL RESPONSES TO PHQ QUESTIONS 1-9: 0

## 2024-06-13 NOTE — PROGRESS NOTES
MA Rooming Questions  Patient: Pilo Seymour  MRN: [id number]    Date: 6/13/2024        1. Do you have any new issues?   Yes- bruising, gums bleeding- today when he brushed his teeth        2. Do you need any refills on medications?    no    3. Have you had any imaging done since your last visit?   no    4. Have you been hospitalized or seen in the emergency room since your last visit here?   no    5. Did the patient have a depression screening completed today? Yes    No data recorded     PHQ-9 Given to (if applicable):               PHQ-9 Score (if applicable):                     [] Positive     []  Negative              Does question #9 need addressed (if applicable)                     [] Yes    []  No               Clotilde Barrios CMA

## 2024-06-25 ENCOUNTER — PATIENT MESSAGE (OUTPATIENT)
Dept: ONCOLOGY | Age: 64
End: 2024-06-25

## 2024-06-25 NOTE — TELEPHONE ENCOUNTER
From: Pilo Seymour  To: Dr. Val Hodges  Sent: 6/25/2024 7:50 AM EDT  Subject: LD test result    I've not noticed one of these tests before. What was being checked with this? What I've read about it, was wondering if this was to check my fatty liver?

## 2024-07-11 ENCOUNTER — OFFICE VISIT (OUTPATIENT)
Dept: FAMILY MEDICINE CLINIC | Age: 64
End: 2024-07-11
Payer: COMMERCIAL

## 2024-07-11 ENCOUNTER — HOSPITAL ENCOUNTER (OUTPATIENT)
Age: 64
Discharge: HOME | End: 2024-07-11
Payer: COMMERCIAL

## 2024-07-11 VITALS
WEIGHT: 228.2 LBS | DIASTOLIC BLOOD PRESSURE: 82 MMHG | HEART RATE: 74 BPM | HEIGHT: 70 IN | OXYGEN SATURATION: 96 % | BODY MASS INDEX: 32.67 KG/M2 | SYSTOLIC BLOOD PRESSURE: 122 MMHG

## 2024-07-11 DIAGNOSIS — K76.0 FATTY LIVER: ICD-10-CM

## 2024-07-11 DIAGNOSIS — R97.20 ELEVATED PSA: ICD-10-CM

## 2024-07-11 DIAGNOSIS — E66.09 CLASS 1 OBESITY DUE TO EXCESS CALORIES WITHOUT SERIOUS COMORBIDITY WITH BODY MASS INDEX (BMI) OF 32.0 TO 32.9 IN ADULT: ICD-10-CM

## 2024-07-11 DIAGNOSIS — N52.9 ERECTILE DYSFUNCTION, UNSPECIFIED ERECTILE DYSFUNCTION TYPE: ICD-10-CM

## 2024-07-11 DIAGNOSIS — Q23.1 BICUSPID AORTIC VALVE: Primary | ICD-10-CM

## 2024-07-11 DIAGNOSIS — I10 ESSENTIAL HYPERTENSION: ICD-10-CM

## 2024-07-11 DIAGNOSIS — Z12.11 COLON CANCER SCREENING: ICD-10-CM

## 2024-07-11 DIAGNOSIS — E78.5 HYPERLIPIDEMIA, UNSPECIFIED HYPERLIPIDEMIA TYPE: ICD-10-CM

## 2024-07-11 LAB
CHOLEST SERPL-MCNC: 145 MG/DL
HDLC SERPL-MCNC: 44 MG/DL
LDLC SERPL CALC-MCNC: 74 MG/DL
PSA, ULTRASENSITIVE: 3.45 NG/ML (ref 0–4)
TRIGL SERPL-MCNC: 135 MG/DL
TSH SERPL DL<=0.005 MIU/L-ACNC: 1.67 UIU/ML (ref 0.27–4.2)

## 2024-07-11 PROCEDURE — 84153 ASSAY OF PSA TOTAL: CPT

## 2024-07-11 PROCEDURE — 3074F SYST BP LT 130 MM HG: CPT | Performed by: STUDENT IN AN ORGANIZED HEALTH CARE EDUCATION/TRAINING PROGRAM

## 2024-07-11 PROCEDURE — 1036F TOBACCO NON-USER: CPT | Performed by: STUDENT IN AN ORGANIZED HEALTH CARE EDUCATION/TRAINING PROGRAM

## 2024-07-11 PROCEDURE — 3079F DIAST BP 80-89 MM HG: CPT | Performed by: STUDENT IN AN ORGANIZED HEALTH CARE EDUCATION/TRAINING PROGRAM

## 2024-07-11 PROCEDURE — 80061 LIPID PANEL: CPT

## 2024-07-11 PROCEDURE — 99214 OFFICE O/P EST MOD 30 MIN: CPT | Performed by: STUDENT IN AN ORGANIZED HEALTH CARE EDUCATION/TRAINING PROGRAM

## 2024-07-11 PROCEDURE — G8427 DOCREV CUR MEDS BY ELIG CLIN: HCPCS | Performed by: STUDENT IN AN ORGANIZED HEALTH CARE EDUCATION/TRAINING PROGRAM

## 2024-07-11 PROCEDURE — G8417 CALC BMI ABV UP PARAM F/U: HCPCS | Performed by: STUDENT IN AN ORGANIZED HEALTH CARE EDUCATION/TRAINING PROGRAM

## 2024-07-11 PROCEDURE — 3017F COLORECTAL CA SCREEN DOC REV: CPT | Performed by: STUDENT IN AN ORGANIZED HEALTH CARE EDUCATION/TRAINING PROGRAM

## 2024-07-11 PROCEDURE — 36415 COLL VENOUS BLD VENIPUNCTURE: CPT

## 2024-07-11 PROCEDURE — 84443 ASSAY THYROID STIM HORMONE: CPT

## 2024-07-11 RX ORDER — TADALAFIL 20 MG/1
20 TABLET ORAL DAILY PRN
Qty: 30 TABLET | Refills: 5 | Status: SHIPPED | OUTPATIENT
Start: 2024-07-11

## 2024-07-11 SDOH — ECONOMIC STABILITY: FOOD INSECURITY: WITHIN THE PAST 12 MONTHS, THE FOOD YOU BOUGHT JUST DIDN'T LAST AND YOU DIDN'T HAVE MONEY TO GET MORE.: NEVER TRUE

## 2024-07-11 SDOH — ECONOMIC STABILITY: HOUSING INSECURITY
IN THE LAST 12 MONTHS, WAS THERE A TIME WHEN YOU DID NOT HAVE A STEADY PLACE TO SLEEP OR SLEPT IN A SHELTER (INCLUDING NOW)?: NO

## 2024-07-11 SDOH — ECONOMIC STABILITY: FOOD INSECURITY: WITHIN THE PAST 12 MONTHS, YOU WORRIED THAT YOUR FOOD WOULD RUN OUT BEFORE YOU GOT MONEY TO BUY MORE.: NEVER TRUE

## 2024-07-11 SDOH — ECONOMIC STABILITY: INCOME INSECURITY: HOW HARD IS IT FOR YOU TO PAY FOR THE VERY BASICS LIKE FOOD, HOUSING, MEDICAL CARE, AND HEATING?: NOT HARD AT ALL

## 2024-07-11 SDOH — ECONOMIC STABILITY: TRANSPORTATION INSECURITY
IN THE PAST 12 MONTHS, HAS LACK OF TRANSPORTATION KEPT YOU FROM MEETINGS, WORK, OR FROM GETTING THINGS NEEDED FOR DAILY LIVING?: NO

## 2024-07-11 NOTE — PROGRESS NOTES
7/21/2024    Pilo Seymour    Chief Complaint   Patient presents with    6 Month Follow-Up     HTN        HPI  History was obtained from patient.  Pilo is a 64 y.o. male with a PMHx as listed below who presents today for follow up on chronic conditions.     Bicuspid aorticv valve follows Dr. Licona annual echo  Elevated psa follows dr dennis  Fatty liveradmits ETOH use  Blood in stool     Chronic ITP follows with Dr. Neal    1. Bicuspid aortic valve    2. Essential hypertension    3. Class 1 obesity due to excess calories without serious comorbidity with body mass index (BMI) of 32.0 to 32.9 in adult    4. Hyperlipidemia, unspecified hyperlipidemia type    5. Elevated PSA    6. Colon cancer screening    7. Fatty liver    8. Erectile dysfunction, unspecified erectile dysfunction type             REVIEW OF SYMPTOMS    Review of Systems   Constitutional:  Negative for chills and fatigue.   HENT:  Negative for congestion and sore throat.    Respiratory:  Negative for shortness of breath and wheezing.    Cardiovascular:  Negative for chest pain and palpitations.   Gastrointestinal:  Negative for abdominal pain and nausea.   Genitourinary:  Negative for frequency and urgency.   Neurological:  Negative for light-headedness.       PAST MEDICAL HISTORY  Past Medical History:   Diagnosis Date    H/O echocardiogram 07/13/2023    EF 55-60%. Dilatation of the aortic root measuring 4.4 cm. Dilatation of the ascending aorta measuring 4.8 cm. Moderate aortic stenosis with mean gradient of 30 mmHg. Doppler evaluation reveals mild aortic, mitral, and tricuspid regurgitation    Hyperlipidemia     Impotence of organic origin        FAMILY HISTORY  Family History   Problem Relation Age of Onset    Other Mother        SOCIAL HISTORY  Social History     Socioeconomic History    Marital status:    Tobacco Use    Smoking status: Never    Smokeless tobacco: Never   Substance and Sexual Activity    Alcohol use: Yes     Comment:

## 2024-08-03 LAB — NONINV COLON CA DNA+OCC BLD SCRN STL QL: NEGATIVE

## 2024-08-08 ENCOUNTER — OFFICE VISIT (OUTPATIENT)
Dept: CARDIOLOGY CLINIC | Age: 64
End: 2024-08-08
Payer: COMMERCIAL

## 2024-08-08 VITALS
HEART RATE: 76 BPM | OXYGEN SATURATION: 97 % | BODY MASS INDEX: 32.78 KG/M2 | SYSTOLIC BLOOD PRESSURE: 112 MMHG | RESPIRATION RATE: 16 BRPM | DIASTOLIC BLOOD PRESSURE: 62 MMHG | HEIGHT: 70 IN | WEIGHT: 229 LBS

## 2024-08-08 DIAGNOSIS — R01.1 HEART MURMUR: ICD-10-CM

## 2024-08-08 DIAGNOSIS — R01.1 MURMUR, CARDIAC: Primary | ICD-10-CM

## 2024-08-08 DIAGNOSIS — I35.0 NONRHEUMATIC AORTIC VALVE STENOSIS: ICD-10-CM

## 2024-08-08 PROCEDURE — G8427 DOCREV CUR MEDS BY ELIG CLIN: HCPCS | Performed by: INTERNAL MEDICINE

## 2024-08-08 PROCEDURE — 1036F TOBACCO NON-USER: CPT | Performed by: INTERNAL MEDICINE

## 2024-08-08 PROCEDURE — 3074F SYST BP LT 130 MM HG: CPT | Performed by: INTERNAL MEDICINE

## 2024-08-08 PROCEDURE — 3078F DIAST BP <80 MM HG: CPT | Performed by: INTERNAL MEDICINE

## 2024-08-08 PROCEDURE — G8417 CALC BMI ABV UP PARAM F/U: HCPCS | Performed by: INTERNAL MEDICINE

## 2024-08-08 PROCEDURE — 99214 OFFICE O/P EST MOD 30 MIN: CPT | Performed by: INTERNAL MEDICINE

## 2024-08-08 PROCEDURE — 3017F COLORECTAL CA SCREEN DOC REV: CPT | Performed by: INTERNAL MEDICINE

## 2024-08-08 PROCEDURE — 93000 ELECTROCARDIOGRAM COMPLETE: CPT | Performed by: INTERNAL MEDICINE

## 2024-08-08 NOTE — PROGRESS NOTES
obesity        Pathophysiology of bicuspid aortic valve and aortic stenosis were discussed with the patient in detail using illustrations.  Explained to the patient that there is no medical treatment for aortic stenosis and eventually it will get progressive, will likely require valve replacement at some point in the future.  Currently patient is totally asymptomatic will observe.  Patient will need annual surveillance echocardiogram  He was warned about the symptoms related to aortic stenosis including chest pain shortness of breath or CHF, lower extremity edema, dizziness presyncope or syncope.      Follow-up echocardiogram confirms moderate aortic stenosis  Gradients have not changed significantly  Aortic root remains around 4.5 cm  Surface echocardiogram next year  Order echocardiogram 1 week prior to next visit in 1 year time       In the meanwhile, pursue healthy lifestyle changes.      Continue with antihypertensive medication including lisinopril hydrochlorothiazide.  Continue with Lipitor 40 mg daily.    Return in about 1 year (around 8/8/2025).          Counseled extensively and medication compliance urged.  We discussed that for the  prevention of ASCVD our  goal is aggressive risk modification.Patient is encouraged to exercise even a brisk walk for 30 minutes  at least 3 to 4 times a week   Various goals were discussed and questions answered. Continue current medications. Appropriate prescriptions are addressed and refills ordered.  Questions answered and patient verbalizes understanding.  Call for any problems, questions, or concerns.

## 2024-12-02 ENCOUNTER — OFFICE VISIT (OUTPATIENT)
Dept: FAMILY MEDICINE CLINIC | Age: 64
End: 2024-12-02
Payer: COMMERCIAL

## 2024-12-02 VITALS
SYSTOLIC BLOOD PRESSURE: 128 MMHG | BODY MASS INDEX: 33.84 KG/M2 | HEIGHT: 70 IN | HEART RATE: 84 BPM | WEIGHT: 236.4 LBS | OXYGEN SATURATION: 95 % | DIASTOLIC BLOOD PRESSURE: 84 MMHG

## 2024-12-02 DIAGNOSIS — M75.101 TEAR OF RIGHT ROTATOR CUFF, UNSPECIFIED TEAR EXTENT, UNSPECIFIED WHETHER TRAUMATIC: ICD-10-CM

## 2024-12-02 DIAGNOSIS — G56.01 ACUTE CARPAL TUNNEL SYNDROME OF RIGHT WRIST: Primary | ICD-10-CM

## 2024-12-02 PROCEDURE — G8427 DOCREV CUR MEDS BY ELIG CLIN: HCPCS | Performed by: STUDENT IN AN ORGANIZED HEALTH CARE EDUCATION/TRAINING PROGRAM

## 2024-12-02 PROCEDURE — 3074F SYST BP LT 130 MM HG: CPT | Performed by: STUDENT IN AN ORGANIZED HEALTH CARE EDUCATION/TRAINING PROGRAM

## 2024-12-02 PROCEDURE — 3017F COLORECTAL CA SCREEN DOC REV: CPT | Performed by: STUDENT IN AN ORGANIZED HEALTH CARE EDUCATION/TRAINING PROGRAM

## 2024-12-02 PROCEDURE — 1036F TOBACCO NON-USER: CPT | Performed by: STUDENT IN AN ORGANIZED HEALTH CARE EDUCATION/TRAINING PROGRAM

## 2024-12-02 PROCEDURE — 3079F DIAST BP 80-89 MM HG: CPT | Performed by: STUDENT IN AN ORGANIZED HEALTH CARE EDUCATION/TRAINING PROGRAM

## 2024-12-02 PROCEDURE — 99214 OFFICE O/P EST MOD 30 MIN: CPT | Performed by: STUDENT IN AN ORGANIZED HEALTH CARE EDUCATION/TRAINING PROGRAM

## 2024-12-02 PROCEDURE — G8484 FLU IMMUNIZE NO ADMIN: HCPCS | Performed by: STUDENT IN AN ORGANIZED HEALTH CARE EDUCATION/TRAINING PROGRAM

## 2024-12-02 PROCEDURE — G8417 CALC BMI ABV UP PARAM F/U: HCPCS | Performed by: STUDENT IN AN ORGANIZED HEALTH CARE EDUCATION/TRAINING PROGRAM

## 2024-12-02 RX ORDER — PREDNISONE 20 MG/1
20 TABLET ORAL DAILY
Qty: 5 TABLET | Refills: 0 | Status: SHIPPED | OUTPATIENT
Start: 2024-12-02 | End: 2024-12-07

## 2024-12-02 NOTE — PROGRESS NOTES
12/2/2024    Pilo Seymour    Chief Complaint   Patient presents with    Wrist Pain     Pt states to having pain in right wrist with numbing in three fingers for about 3 weeks, pt states to taking tylenol, ibuprofen, bio freeze no success.  Pain and strength worsening      Shoulder Pain     Pt state bilateral should pain for about 3 weeks same time as wrist ,pt stated to taking tylenol, ibuprofen and bio freeze with no success. Pt state to have trouble lifting arms up in certain directions.        HPI      History of Present Illness  The patient is a 64-year-old male who presents for evaluation of acute wrist pain.    He began experiencing acute wrist pain approximately 3 weeks ago, accompanied by numbness in his thumb, middle finger, and index finger. He occasionally experiences sharp, stabbing pain and sometimes a burning sensation in his right wrist. He has noticed a loss of  strength. Attempts to use a wrist brace have resulted in increased pain and continuous numbness, which subsides upon removal of the brace. He has not had any x-rays.    Concurrent with the wrist pain, he developed bilateral shoulder pain. The pain in his shoulder radiates downwards and feels more like muscle pain. He reports difficulty in lifting his arms, which varies depending on the direction of movement. He has tried Tylenol, ibuprofen, and Biofreeze to alleviate the pain but has not found relief. He reports no neck pain or history of shoulder or neck surgeries.    He has undergone ankle surgery in the past.      1. Acute carpal tunnel syndrome of right wrist    2. Tear of right rotator cuff, unspecified tear extent, unspecified whether traumatic             REVIEW OF SYMPTOMS    Review of Systems   Constitutional:  Negative for chills and fatigue.   HENT:  Negative for congestion and sore throat.    Respiratory:  Negative for shortness of breath and wheezing.    Cardiovascular:  Negative for chest pain and palpitations.

## 2024-12-05 ENCOUNTER — PROCEDURE VISIT (OUTPATIENT)
Dept: NEUROLOGY | Age: 64
End: 2024-12-05

## 2024-12-05 DIAGNOSIS — E78.5 HYPERLIPIDEMIA, UNSPECIFIED HYPERLIPIDEMIA TYPE: ICD-10-CM

## 2024-12-05 DIAGNOSIS — G56.01 ACUTE CARPAL TUNNEL SYNDROME OF RIGHT WRIST: ICD-10-CM

## 2024-12-05 RX ORDER — ATORVASTATIN CALCIUM 40 MG/1
40 TABLET, FILM COATED ORAL DAILY
Qty: 30 TABLET | Refills: 5 | Status: SHIPPED | OUTPATIENT
Start: 2024-12-05

## 2024-12-05 NOTE — PROGRESS NOTES
demonstrates normal amplitude, and distal latency which is normal.   -- Right ulnar sensory nerve conduction response demonstrates normal amplitude, and distal latency which is normal.   -- Left ulnar sensory nerve conduction response demonstrates normal amplitude, and distal latency which is normal.   -- Right radial sensory study is normal for amplitude and latency.      NEEDLE ELECTRODE EXAMINATION  Needle examination performed throughout multiple, selected muscles of the right upper limb  (as detailed on the data sheet) demonstrates findings which are normal     Diagnosis Orders   1. Acute carpal tunnel syndrome of right wrist  EMG           Impression:     This is an abnormal electrodiagnostic study notable for the followin. Moderate median neuropathy at the wrist is suggested on the right, this is characterized by slowing of the sensory and motor latencies without evidence of axon loss.  Clinical correlate of carpal tunnel symptoms are present.  2.  Otherwise, no evidence to suggest a generalized neuropathy, cervical radiculopathy, brachial plexopathy or myopathy.    Thank you for allowing us to participate in the care of your patient.  Please do not hesitate to contact us with questions.    Lisa Zelaya DO  2024 1:06 PM  
Daily Assessment

## 2024-12-08 ENCOUNTER — PATIENT MESSAGE (OUTPATIENT)
Dept: FAMILY MEDICINE CLINIC | Age: 64
End: 2024-12-08

## 2024-12-08 DIAGNOSIS — I10 ESSENTIAL HYPERTENSION: ICD-10-CM

## 2024-12-08 DIAGNOSIS — G56.01 ACUTE CARPAL TUNNEL SYNDROME OF RIGHT WRIST: Primary | ICD-10-CM

## 2024-12-09 RX ORDER — LISINOPRIL AND HYDROCHLOROTHIAZIDE 10; 12.5 MG/1; MG/1
1 TABLET ORAL DAILY
Qty: 30 TABLET | Refills: 5 | Status: SHIPPED | OUTPATIENT
Start: 2024-12-09

## 2024-12-09 RX ORDER — PREDNISONE 20 MG/1
40 TABLET ORAL DAILY
Qty: 10 TABLET | Refills: 0 | Status: SHIPPED | OUTPATIENT
Start: 2024-12-09 | End: 2024-12-14

## 2024-12-12 ENCOUNTER — HOSPITAL ENCOUNTER (OUTPATIENT)
Dept: INFUSION THERAPY | Age: 64
Discharge: HOME | End: 2024-12-12
Payer: COMMERCIAL

## 2024-12-12 DIAGNOSIS — D69.6 THROMBOCYTOPENIA (HCC): ICD-10-CM

## 2024-12-12 LAB
BASOPHILS # BLD: 0.01 K/UL
BASOPHILS NFR BLD: 0 % (ref 0–1)
EOSINOPHIL # BLD: 0.02 K/UL
EOSINOPHILS RELATIVE PERCENT: 0 % (ref 0–3)
ERYTHROCYTE [DISTWIDTH] IN BLOOD BY AUTOMATED COUNT: 13.5 % (ref 11.7–14.9)
HCT VFR BLD AUTO: 40.7 % (ref 42–52)
HGB BLD-MCNC: 13.7 G/DL (ref 13.5–18)
LDH SERPL-CCNC: 177 U/L (ref 100–190)
LYMPHOCYTES NFR BLD: 1.73 K/UL
LYMPHOCYTES RELATIVE PERCENT: 19 % (ref 24–44)
MCH RBC QN AUTO: 28.4 PG (ref 27–31)
MCHC RBC AUTO-ENTMCNC: 33.7 G/DL (ref 32–36)
MCV RBC AUTO: 84.3 FL (ref 78–100)
MONOCYTES NFR BLD: 0.73 K/UL
MONOCYTES NFR BLD: 8 % (ref 0–4)
NEUTROPHILS NFR BLD: 73 % (ref 36–66)
NEUTS SEG NFR BLD: 6.64 K/UL
PLATELET # BLD AUTO: 218 K/UL (ref 140–440)
PMV BLD AUTO: 9.8 FL (ref 7.5–11.1)
RBC # BLD AUTO: 4.83 M/UL (ref 4.6–6.2)
WBC OTHER # BLD: 9.1 K/UL (ref 4–10.5)

## 2024-12-12 PROCEDURE — 83615 LACTATE (LD) (LDH) ENZYME: CPT

## 2024-12-12 PROCEDURE — 36415 COLL VENOUS BLD VENIPUNCTURE: CPT

## 2024-12-12 PROCEDURE — 85025 COMPLETE CBC W/AUTO DIFF WBC: CPT

## 2024-12-16 ASSESSMENT — ENCOUNTER SYMPTOMS
WHEEZING: 0
SORE THROAT: 0
NAUSEA: 0
ABDOMINAL PAIN: 0
SHORTNESS OF BREATH: 0

## 2024-12-17 ENCOUNTER — PATIENT MESSAGE (OUTPATIENT)
Dept: FAMILY MEDICINE CLINIC | Age: 64
End: 2024-12-17

## 2024-12-17 DIAGNOSIS — G56.01 ACUTE CARPAL TUNNEL SYNDROME OF RIGHT WRIST: Primary | ICD-10-CM

## 2024-12-17 RX ORDER — METHYLPREDNISOLONE 4 MG/1
TABLET ORAL
Qty: 1 KIT | Refills: 0 | Status: SHIPPED | OUTPATIENT
Start: 2024-12-17 | End: 2024-12-23

## 2024-12-17 SDOH — HEALTH STABILITY: PHYSICAL HEALTH: ON AVERAGE, HOW MANY MINUTES DO YOU ENGAGE IN EXERCISE AT THIS LEVEL?: 0 MIN

## 2024-12-17 SDOH — HEALTH STABILITY: PHYSICAL HEALTH: ON AVERAGE, HOW MANY DAYS PER WEEK DO YOU ENGAGE IN MODERATE TO STRENUOUS EXERCISE (LIKE A BRISK WALK)?: 0 DAYS

## 2024-12-18 ENCOUNTER — OFFICE VISIT (OUTPATIENT)
Dept: ORTHOPEDIC SURGERY | Age: 64
End: 2024-12-18
Payer: COMMERCIAL

## 2024-12-18 VITALS
RESPIRATION RATE: 14 BRPM | WEIGHT: 230 LBS | BODY MASS INDEX: 32.93 KG/M2 | HEART RATE: 97 BPM | OXYGEN SATURATION: 100 % | HEIGHT: 70 IN

## 2024-12-18 DIAGNOSIS — G56.01 RIGHT CARPAL TUNNEL SYNDROME: Primary | ICD-10-CM

## 2024-12-18 PROCEDURE — 99204 OFFICE O/P NEW MOD 45 MIN: CPT | Performed by: STUDENT IN AN ORGANIZED HEALTH CARE EDUCATION/TRAINING PROGRAM

## 2024-12-18 PROCEDURE — 1036F TOBACCO NON-USER: CPT | Performed by: STUDENT IN AN ORGANIZED HEALTH CARE EDUCATION/TRAINING PROGRAM

## 2024-12-18 PROCEDURE — G8484 FLU IMMUNIZE NO ADMIN: HCPCS | Performed by: STUDENT IN AN ORGANIZED HEALTH CARE EDUCATION/TRAINING PROGRAM

## 2024-12-18 PROCEDURE — G8417 CALC BMI ABV UP PARAM F/U: HCPCS | Performed by: STUDENT IN AN ORGANIZED HEALTH CARE EDUCATION/TRAINING PROGRAM

## 2024-12-18 PROCEDURE — 3017F COLORECTAL CA SCREEN DOC REV: CPT | Performed by: STUDENT IN AN ORGANIZED HEALTH CARE EDUCATION/TRAINING PROGRAM

## 2024-12-18 PROCEDURE — G8427 DOCREV CUR MEDS BY ELIG CLIN: HCPCS | Performed by: STUDENT IN AN ORGANIZED HEALTH CARE EDUCATION/TRAINING PROGRAM

## 2024-12-18 NOTE — PROGRESS NOTES
12/18/2024   Chief Complaint   Patient presents with    Wrist Pain     Right CTS        History of Present Illness:                             Pilo Seymour is a 64 y.o. male   Who is here to in office seeing our PA, Nick Bowersr.    Patient has right hand-dominant patient, and has a diagnosis of right carpal tunnel syndrome.  Patient states that their symptoms have been occurring for approximately 6 months.  They state it wakes them from sleep at night.    The pain is currently rated moderate.  Patient states that they have numbness and tingling in the median nerve distribution of the right hand.  They deny any ulnar nerve symptoms.    Patient has been seen by primary care for work-up which includes EMG.      They have attempted splinting, anti-inflammatories without any relief.    Related history: negative for prior surgery, trauma, arthritis or disorders.     Is affecting ADLs.  Pain is 10/10 at it's worst.       Medical History  Patient's medications, allergies, past medical, surgical, social and family histories were reviewed and updated as appropriate.    Past Medical History:   Diagnosis Date    H/O echocardiogram 07/13/2023    EF 55-60%. Dilatation of the aortic root measuring 4.4 cm. Dilatation of the ascending aorta measuring 4.8 cm. Moderate aortic stenosis with mean gradient of 30 mmHg. Doppler evaluation reveals mild aortic, mitral, and tricuspid regurgitation    H/O echocardiogram 08/06/2024    EF of 55 - 60%. Aortic Valve: Moderately calcified cusps. Mild regurgitation. moderate stenosis Mitral Valve: Mild regurgitation. Tricuspid Valve: Mild regurgitation. Normal RVSP. Pulmonic Valve: Mild regurgitation. Aorta: Dilated aortic root. Ao root diameter is 3.8 cm. Ao ascending diameter is 4.5 cm.    Hyperlipidemia     Impotence of organic origin      Past Surgical History:   Procedure Laterality Date    ANKLE SURGERY Left      Family History   Problem Relation Age of Onset    Other Mother

## 2024-12-18 NOTE — PATIENT INSTRUCTIONS
We will schedule surgery at soonest convenience. If you have any questions regarding your surgery please call our office and ask to speak with Kaur [phone]    We are committed to providing you the best care possible.  If you receive a survey after visiting one of our offices, please take time to share your experience concerning your physician office visit.  These surveys are confidential and no health information about you is shared.  We are eager to improve for you and we are counting on your feedback to help make that happen.

## 2024-12-19 ENCOUNTER — TELEPHONE (OUTPATIENT)
Dept: ORTHOPEDIC SURGERY | Age: 64
End: 2024-12-19

## 2024-12-19 ENCOUNTER — OFFICE VISIT (OUTPATIENT)
Dept: ONCOLOGY | Age: 64
End: 2024-12-19
Payer: COMMERCIAL

## 2024-12-19 ENCOUNTER — HOSPITAL ENCOUNTER (OUTPATIENT)
Dept: INFUSION THERAPY | Age: 64
Discharge: HOME | End: 2024-12-19
Payer: COMMERCIAL

## 2024-12-19 VITALS
OXYGEN SATURATION: 96 % | HEART RATE: 86 BPM | TEMPERATURE: 98.9 F | WEIGHT: 236 LBS | BODY MASS INDEX: 33.79 KG/M2 | DIASTOLIC BLOOD PRESSURE: 85 MMHG | SYSTOLIC BLOOD PRESSURE: 126 MMHG | HEIGHT: 70 IN

## 2024-12-19 DIAGNOSIS — Z01.818 PREOP EXAMINATION: Primary | ICD-10-CM

## 2024-12-19 DIAGNOSIS — D69.6 THROMBOCYTOPENIA (HCC): Primary | ICD-10-CM

## 2024-12-19 PROCEDURE — 1036F TOBACCO NON-USER: CPT | Performed by: INTERNAL MEDICINE

## 2024-12-19 PROCEDURE — 3017F COLORECTAL CA SCREEN DOC REV: CPT | Performed by: INTERNAL MEDICINE

## 2024-12-19 PROCEDURE — 99213 OFFICE O/P EST LOW 20 MIN: CPT | Performed by: INTERNAL MEDICINE

## 2024-12-19 PROCEDURE — G8484 FLU IMMUNIZE NO ADMIN: HCPCS | Performed by: INTERNAL MEDICINE

## 2024-12-19 PROCEDURE — G8427 DOCREV CUR MEDS BY ELIG CLIN: HCPCS | Performed by: INTERNAL MEDICINE

## 2024-12-19 PROCEDURE — 3079F DIAST BP 80-89 MM HG: CPT | Performed by: INTERNAL MEDICINE

## 2024-12-19 PROCEDURE — G8417 CALC BMI ABV UP PARAM F/U: HCPCS | Performed by: INTERNAL MEDICINE

## 2024-12-19 PROCEDURE — 99211 OFF/OP EST MAY X REQ PHY/QHP: CPT

## 2024-12-19 PROCEDURE — 3074F SYST BP LT 130 MM HG: CPT | Performed by: INTERNAL MEDICINE

## 2024-12-19 ASSESSMENT — PATIENT HEALTH QUESTIONNAIRE - PHQ9
2. FEELING DOWN, DEPRESSED OR HOPELESS: NOT AT ALL
SUM OF ALL RESPONSES TO PHQ QUESTIONS 1-9: 0
1. LITTLE INTEREST OR PLEASURE IN DOING THINGS: NOT AT ALL
SUM OF ALL RESPONSES TO PHQ9 QUESTIONS 1 & 2: 0
SUM OF ALL RESPONSES TO PHQ QUESTIONS 1-9: 0

## 2024-12-19 ASSESSMENT — ENCOUNTER SYMPTOMS
SHORTNESS OF BREATH: 0
DIARRHEA: 0
VOMITING: 0
COUGH: 0
WHEEZING: 0
COLOR CHANGE: 0
BACK PAIN: 0
NAUSEA: 0
SORE THROAT: 0

## 2024-12-19 NOTE — TELEPHONE ENCOUNTER
Patient scheduled for    Right Carpal Tunnel Release  Date: 1/2/25  Facility: [facility]  Surgeon: Geo Brown DO    Case Request 12/19/24  PCP, cardiac and onc clearances routed via Epic 12/19/24  Pre op appt 12/18/24    [facility]  Contacted 12/19/24 via portal  Status: NPR CPT 47045 ICD G56.01 for outpatient  DecisionID# [id number]    Phone PAT

## 2024-12-19 NOTE — PROGRESS NOTES
MA Rooming Questions  Patient: Pilo Seymour  MRN: [id number]    Date: 12/19/2024        1. Do you have any new issues?   no         2. Do you need any refills on medications?    no    3. Have you had any imaging done since your last visit?   yes - labs 12/12    4. Have you been hospitalized or seen in the emergency room since your last visit here?   no    5. Did the patient have a depression screening completed today? Yes    PHQ-9 Total Score: 0 (12/19/2024  3:48 PM)       PHQ-9 Given to (if applicable):               PHQ-9 Score (if applicable):                     [] Positive     []  Negative              Does question #9 need addressed (if applicable)                     [] Yes    []  No               Yin Acevedo CMA      
  Hyperlipidemia   Hypertension  Impotence of organic origin   Chronic ITP     Past Surgery History:    No history of colonoscopy.  Bone marrow biopsy and aspiration.    Social History:   He denied any smoking history.  He drinks 4-5 beers weekly.  He has 2 children.    Family History:   No malignancy in the family.     Review of Systems:  The remainder of the review of system is unremarkable.     Vital Signs: /85 (Site: Left Upper Arm, Position: Sitting, Cuff Size: Large Adult)   Pulse 86   Temp 98.9 °F (37.2 °C) (Temporal)   Ht 1.778 m (5' 10\")   Wt 107 kg (236 lb)   SpO2 96%   BMI 33.86 kg/m²      Physical Exam:  CONSTITUTIONAL: awake, alert, cooperative, no apparent distress   EYES: pupils equal and round. Sclera clear and conjunctiva normal  ENT: Normocephalic, without obvious abnormality, atraumatic  NECK: supple, symmetrical, no jugular venous distension and no carotid bruits   HEMATOLOGIC/LYMPHATIC: no cervical, supraclavicular or axillary lymphadenopathy   LUNGS: no increased work of breathing, clear to auscultation bilaterally  CARDIOVASCULAR: regular rate and rhythm, normal S1 and S2, + murmur right upper parasternal border.  ABDOMEN: bowel sound present, soft, non-distended, non-tender, no organomegaly.  MUSCULOSKELETAL: full range of motion noted, tone is normal  NEUROLOGIC: Motor skills grossly intact. CN II - XII grossly intact.  SKIN: No jaundice. appears intact.  No petechial rash.    EXTREMITIES: no LE edema or cyanosis. Homans -ve.    Labs:  Hematology:  Lab Results   Component Value Date    WBC 9.1 12/12/2024    RBC 4.83 12/12/2024    HGB 13.7 12/12/2024    HCT 40.7 (L) 12/12/2024    MCV 84.3 12/12/2024    MCH 28.4 12/12/2024    MCHC 33.7 12/12/2024    RDW 13.5 12/12/2024     12/12/2024    MPV 9.8 12/12/2024    BASOPCT 0 12/12/2024    LYMPHOPCT 19 (L) 12/12/2024    MONOPCT 8 (H) 12/12/2024    EOSABS 0.02 12/12/2024    BASOSABS 0.01 12/12/2024    LYMPHSABS 1.73 12/12/2024

## 2024-12-22 ENCOUNTER — PATIENT MESSAGE (OUTPATIENT)
Dept: FAMILY MEDICINE CLINIC | Age: 64
End: 2024-12-22

## 2024-12-23 ENCOUNTER — TELEPHONE (OUTPATIENT)
Dept: CARDIOLOGY CLINIC | Age: 64
End: 2024-12-23

## 2024-12-23 NOTE — TELEPHONE ENCOUNTER
I know this is Dr. Paulino patient but he is out can you clear this patient for surgery please!    Cardiologist: Dr. Bass  Surgeon: Dr. Brown   Surgery: Rt. Carpal tunnel   Anesthesia: Local, MAC  Date: 1/2/25  FAX# [phone]    # [phone]    Last OV 8/8/24 Andre

## 2024-12-26 RX ORDER — IBUPROFEN 800 MG/1
800 TABLET, FILM COATED ORAL 3 TIMES DAILY PRN
Qty: 30 TABLET | Refills: 0 | Status: SHIPPED | OUTPATIENT
Start: 2024-12-26 | End: 2025-01-05

## 2024-12-26 NOTE — TELEPHONE ENCOUNTER
Do not want to give him too many steroids before procedure. We can give low dose norco for few days if ok with patient.

## 2024-12-27 ENCOUNTER — TELEPHONE (OUTPATIENT)
Dept: ORTHOPEDIC SURGERY | Age: 64
End: 2024-12-27

## 2024-12-30 ENCOUNTER — HOSPITAL ENCOUNTER (OUTPATIENT)
Dept: INFUSION THERAPY | Age: 64
Discharge: HOME | End: 2024-12-30
Payer: COMMERCIAL

## 2024-12-30 DIAGNOSIS — D69.6 THROMBOCYTOPENIA (HCC): ICD-10-CM

## 2024-12-30 LAB
ALBUMIN SERPL-MCNC: 4.1 G/DL (ref 3.4–5)
ALBUMIN/GLOB SERPL: 1.6 {RATIO} (ref 1.1–2.2)
ALP SERPL-CCNC: 77 U/L (ref 40–129)
ALT SERPL-CCNC: 26 U/L (ref 10–40)
ANION GAP SERPL CALCULATED.3IONS-SCNC: 12 MMOL/L (ref 9–17)
AST SERPL-CCNC: 22 U/L (ref 15–37)
BASOPHILS # BLD: 0.01 K/UL
BASOPHILS NFR BLD: 0 % (ref 0–1)
BILIRUB SERPL-MCNC: 0.3 MG/DL (ref 0–1)
BUN SERPL-MCNC: 15 MG/DL (ref 7–20)
CALCIUM SERPL-MCNC: 9.5 MG/DL (ref 8.3–10.6)
CHLORIDE SERPL-SCNC: 102 MMOL/L (ref 99–110)
CO2 SERPL-SCNC: 24 MMOL/L (ref 21–32)
CREAT SERPL-MCNC: 0.9 MG/DL (ref 0.8–1.3)
EOSINOPHIL # BLD: 0.02 K/UL
EOSINOPHILS RELATIVE PERCENT: 0 % (ref 0–3)
ERYTHROCYTE [DISTWIDTH] IN BLOOD BY AUTOMATED COUNT: 13.3 % (ref 11.7–14.9)
GFR, ESTIMATED: 88 ML/MIN/1.73M2
GLUCOSE SERPL-MCNC: 97 MG/DL (ref 74–99)
HCT VFR BLD AUTO: 37.9 % (ref 42–52)
HGB BLD-MCNC: 12.8 G/DL (ref 13.5–18)
LDH SERPL-CCNC: 188 U/L (ref 100–190)
LYMPHOCYTES NFR BLD: 0.79 K/UL
LYMPHOCYTES RELATIVE PERCENT: 12 % (ref 24–44)
MCH RBC QN AUTO: 28.4 PG (ref 27–31)
MCHC RBC AUTO-ENTMCNC: 33.8 G/DL (ref 32–36)
MCV RBC AUTO: 84 FL (ref 78–100)
MONOCYTES NFR BLD: 0.53 K/UL
MONOCYTES NFR BLD: 8 % (ref 0–4)
NEUTROPHILS NFR BLD: 80 % (ref 36–66)
NEUTS SEG NFR BLD: 5.39 K/UL
PLATELET # BLD AUTO: 139 K/UL (ref 140–440)
PMV BLD AUTO: 9.7 FL (ref 7.5–11.1)
POTASSIUM SERPL-SCNC: 3.9 MMOL/L (ref 3.5–5.1)
PROT SERPL-MCNC: 6.6 G/DL (ref 6.4–8.2)
RBC # BLD AUTO: 4.51 M/UL (ref 4.6–6.2)
SODIUM SERPL-SCNC: 138 MMOL/L (ref 136–145)
WBC OTHER # BLD: 6.7 K/UL (ref 4–10.5)

## 2024-12-30 PROCEDURE — 80053 COMPREHEN METABOLIC PANEL: CPT

## 2024-12-30 PROCEDURE — 83615 LACTATE (LD) (LDH) ENZYME: CPT

## 2024-12-30 PROCEDURE — 36415 COLL VENOUS BLD VENIPUNCTURE: CPT

## 2024-12-30 PROCEDURE — 85025 COMPLETE CBC W/AUTO DIFF WBC: CPT

## 2024-12-31 ENCOUNTER — PATIENT MESSAGE (OUTPATIENT)
Dept: FAMILY MEDICINE CLINIC | Age: 64
End: 2024-12-31

## 2024-12-31 DIAGNOSIS — J20.9 ACUTE BRONCHITIS, UNSPECIFIED ORGANISM: Primary | ICD-10-CM

## 2024-12-31 RX ORDER — DOXYCYCLINE HYCLATE 100 MG
100 TABLET ORAL 2 TIMES DAILY
Qty: 20 TABLET | Refills: 0 | Status: SHIPPED | OUTPATIENT
Start: 2024-12-31 | End: 2025-01-10

## 2025-01-03 ENCOUNTER — HOSPITAL ENCOUNTER (OUTPATIENT)
Dept: GENERAL RADIOLOGY | Age: 65
Discharge: HOME | End: 2025-01-03
Payer: COMMERCIAL

## 2025-01-03 ENCOUNTER — HOSPITAL ENCOUNTER (OUTPATIENT)
Age: 65
Discharge: HOME | End: 2025-01-03
Payer: COMMERCIAL

## 2025-01-03 DIAGNOSIS — J20.9 ACUTE BRONCHITIS, UNSPECIFIED ORGANISM: ICD-10-CM

## 2025-01-03 PROCEDURE — 71046 X-RAY EXAM CHEST 2 VIEWS: CPT

## 2025-01-07 DIAGNOSIS — G56.01 RIGHT CARPAL TUNNEL SYNDROME: Primary | ICD-10-CM

## 2025-01-07 RX ORDER — HYDROCODONE BITARTRATE AND ACETAMINOPHEN 5; 325 MG/1; MG/1
1 TABLET ORAL EVERY 6 HOURS PRN
Qty: 28 TABLET | Refills: 0 | Status: SHIPPED | OUTPATIENT
Start: 2025-01-07 | End: 2025-01-14

## 2025-01-07 RX ORDER — CYCLOBENZAPRINE HCL 5 MG
5 TABLET ORAL 2 TIMES DAILY PRN
Qty: 30 TABLET | Refills: 2 | Status: SHIPPED | OUTPATIENT
Start: 2025-01-07 | End: 2025-01-17

## 2025-01-07 RX ORDER — ONDANSETRON 4 MG/1
4 TABLET, ORALLY DISINTEGRATING ORAL 3 TIMES DAILY PRN
Qty: 21 TABLET | Refills: 0 | Status: SHIPPED | OUTPATIENT
Start: 2025-01-07

## 2025-01-11 SDOH — ECONOMIC STABILITY: FOOD INSECURITY: WITHIN THE PAST 12 MONTHS, THE FOOD YOU BOUGHT JUST DIDN'T LAST AND YOU DIDN'T HAVE MONEY TO GET MORE.: NEVER TRUE

## 2025-01-11 SDOH — ECONOMIC STABILITY: FOOD INSECURITY: WITHIN THE PAST 12 MONTHS, YOU WORRIED THAT YOUR FOOD WOULD RUN OUT BEFORE YOU GOT MONEY TO BUY MORE.: NEVER TRUE

## 2025-01-11 SDOH — ECONOMIC STABILITY: INCOME INSECURITY: IN THE LAST 12 MONTHS, WAS THERE A TIME WHEN YOU WERE NOT ABLE TO PAY THE MORTGAGE OR RENT ON TIME?: NO

## 2025-01-11 SDOH — ECONOMIC STABILITY: TRANSPORTATION INSECURITY
IN THE PAST 12 MONTHS, HAS THE LACK OF TRANSPORTATION KEPT YOU FROM MEDICAL APPOINTMENTS OR FROM GETTING MEDICATIONS?: NO

## 2025-01-11 ASSESSMENT — PATIENT HEALTH QUESTIONNAIRE - PHQ9
SUM OF ALL RESPONSES TO PHQ QUESTIONS 1-9: 0
SUM OF ALL RESPONSES TO PHQ9 QUESTIONS 1 & 2: 0
SUM OF ALL RESPONSES TO PHQ QUESTIONS 1-9: 0
SUM OF ALL RESPONSES TO PHQ QUESTIONS 1-9: 0
1. LITTLE INTEREST OR PLEASURE IN DOING THINGS: NOT AT ALL
2. FEELING DOWN, DEPRESSED OR HOPELESS: NOT AT ALL
SUM OF ALL RESPONSES TO PHQ QUESTIONS 1-9: 0

## 2025-01-13 ASSESSMENT — PATIENT HEALTH QUESTIONNAIRE - PHQ9
1. LITTLE INTEREST OR PLEASURE IN DOING THINGS: NOT AT ALL
SUM OF ALL RESPONSES TO PHQ9 QUESTIONS 1 & 2: 0
2. FEELING DOWN, DEPRESSED OR HOPELESS: NOT AT ALL

## 2025-01-15 ENCOUNTER — OFFICE VISIT (OUTPATIENT)
Dept: ORTHOPEDIC SURGERY | Age: 65
End: 2025-01-15

## 2025-01-15 VITALS
TEMPERATURE: 97.9 F | HEIGHT: 70 IN | BODY MASS INDEX: 32.93 KG/M2 | OXYGEN SATURATION: 97 % | HEART RATE: 69 BPM | RESPIRATION RATE: 14 BRPM | WEIGHT: 230 LBS

## 2025-01-15 DIAGNOSIS — Z98.890 S/P CARPAL TUNNEL RELEASE: Primary | ICD-10-CM

## 2025-01-15 PROCEDURE — 99024 POSTOP FOLLOW-UP VISIT: CPT | Performed by: PHYSICIAN ASSISTANT

## 2025-01-15 NOTE — PATIENT INSTRUCTIONS
Stitches will be removed at your next appointment.  Avoid any direct pressure down onto the palm of the hand  Use a stress ball to help with exercises of the hand and fingers  Ice and elevate as needed  Use Tylenol and Motrin as needed for pain  Follow up in next week.    We are committed to providing you the best care possible.  If you receive a survey after visiting one of our offices, please take time to share your experience concerning your physician office visit.  These surveys are confidential and no health information about you is shared.  We are eager to improve for you and we are counting on your feedback to help make that happen.

## 2025-01-17 NOTE — PROGRESS NOTES
Date of surgery:   1/7/2025  Surgeon Dr. Geo Brown    History:  Mr. Pilo Seymour is here in follow up regarding his right carpal tunnel release.  Patient states that he still has some numbness in his thumb but some of the numbness in his fingertips are getting better.  He states that he would like to go back to work if possible.    Physical:  Vitals:    01/15/25 0829   Pulse: 69   Resp: 14   Temp: 97.9 °F (36.6 °C)   SpO2: 97%        He demonstrates appropriate mood and affect.   Right hand:  Surgical incision is well-healed with wound edges approximated nicely.  No active drainage.  Nylon sutures in place.  Decree sensation to light touch in the thumb.  Patient able to make a full fist.      Impression: Status post right carpal tunnel release-doing well    Plan:   Stitches removed  Splint removed  Avoid direct pressure over the hand.  Follow-up in 4 weeks.

## 2025-02-07 ENCOUNTER — OFFICE VISIT (OUTPATIENT)
Dept: FAMILY MEDICINE CLINIC | Age: 65
End: 2025-02-07
Payer: COMMERCIAL

## 2025-02-07 VITALS
HEIGHT: 70 IN | BODY MASS INDEX: 33.07 KG/M2 | WEIGHT: 231 LBS | HEART RATE: 88 BPM | SYSTOLIC BLOOD PRESSURE: 116 MMHG | DIASTOLIC BLOOD PRESSURE: 78 MMHG | OXYGEN SATURATION: 99 %

## 2025-02-07 DIAGNOSIS — K76.0 FATTY LIVER: Primary | ICD-10-CM

## 2025-02-07 DIAGNOSIS — I10 ESSENTIAL HYPERTENSION: ICD-10-CM

## 2025-02-07 DIAGNOSIS — D69.6 THROMBOCYTOPENIA (HCC): ICD-10-CM

## 2025-02-07 DIAGNOSIS — E66.811 CLASS 1 OBESITY DUE TO EXCESS CALORIES WITHOUT SERIOUS COMORBIDITY WITH BODY MASS INDEX (BMI) OF 32.0 TO 32.9 IN ADULT: ICD-10-CM

## 2025-02-07 DIAGNOSIS — E78.5 HYPERLIPIDEMIA, UNSPECIFIED HYPERLIPIDEMIA TYPE: ICD-10-CM

## 2025-02-07 DIAGNOSIS — E66.09 CLASS 1 OBESITY DUE TO EXCESS CALORIES WITHOUT SERIOUS COMORBIDITY WITH BODY MASS INDEX (BMI) OF 32.0 TO 32.9 IN ADULT: ICD-10-CM

## 2025-02-07 DIAGNOSIS — N40.0 BENIGN PROSTATIC HYPERPLASIA, UNSPECIFIED WHETHER LOWER URINARY TRACT SYMPTOMS PRESENT: ICD-10-CM

## 2025-02-07 DIAGNOSIS — J01.10 ACUTE NON-RECURRENT FRONTAL SINUSITIS: ICD-10-CM

## 2025-02-07 PROCEDURE — 99214 OFFICE O/P EST MOD 30 MIN: CPT | Performed by: STUDENT IN AN ORGANIZED HEALTH CARE EDUCATION/TRAINING PROGRAM

## 2025-02-07 PROCEDURE — 3078F DIAST BP <80 MM HG: CPT | Performed by: STUDENT IN AN ORGANIZED HEALTH CARE EDUCATION/TRAINING PROGRAM

## 2025-02-07 PROCEDURE — 3074F SYST BP LT 130 MM HG: CPT | Performed by: STUDENT IN AN ORGANIZED HEALTH CARE EDUCATION/TRAINING PROGRAM

## 2025-02-07 SDOH — ECONOMIC STABILITY: FOOD INSECURITY: WITHIN THE PAST 12 MONTHS, YOU WORRIED THAT YOUR FOOD WOULD RUN OUT BEFORE YOU GOT MONEY TO BUY MORE.: NEVER TRUE

## 2025-02-07 SDOH — ECONOMIC STABILITY: FOOD INSECURITY: WITHIN THE PAST 12 MONTHS, THE FOOD YOU BOUGHT JUST DIDN'T LAST AND YOU DIDN'T HAVE MONEY TO GET MORE.: NEVER TRUE

## 2025-02-07 NOTE — PROGRESS NOTES
tablet Take 1 tablet by mouth 3 times daily as needed for Pain 30 tablet 0    lisinopril-hydroCHLOROthiazide (PRINZIDE;ZESTORETIC) 10-12.5 MG per tablet TAKE 1 TABLET BY MOUTH DAILY 30 tablet 5    atorvastatin (LIPITOR) 40 MG tablet TAKE 1 TABLET BY MOUTH DAILY 30 tablet 5    DAILY MULTIPLE VITAMINS PO Take by mouth      Omeprazole 20 MG TBDD       Cholecalciferol (VITAMIN D3 PO) Take by mouth      MELATONIN PO Take by mouth      ondansetron (ZOFRAN-ODT) 4 MG disintegrating tablet Take 1 tablet by mouth 3 times daily as needed for Nausea or Vomiting (Patient not taking: Reported on 2/7/2025) 21 tablet 0    tadalafil (CIALIS) 20 MG tablet Take 1 tablet by mouth daily as needed for Erectile Dysfunction (Patient not taking: Reported on 12/2/2024) 30 tablet 5     No current facility-administered medications for this visit.       ALLERGIES  No Known Allergies    PHYSICAL EXAM    /78 (Site: Left Upper Arm, Position: Sitting, Cuff Size: Medium Adult)   Pulse 88   Ht 1.778 m (5' 10\")   Wt 104.8 kg (231 lb)   SpO2 99%   BMI 33.15 kg/m²     Physical Exam  Constitutional:       Appearance: Normal appearance.   HENT:      Head: Normocephalic and atraumatic.   Eyes:      Extraocular Movements: Extraocular movements intact.      Pupils: Pupils are equal, round, and reactive to light.   Cardiovascular:      Rate and Rhythm: Normal rate and regular rhythm.      Pulses: Normal pulses.      Heart sounds: No murmur heard.     No friction rub. No gallop.   Pulmonary:      Effort: Pulmonary effort is normal.      Breath sounds: Normal breath sounds.   Skin:     General: Skin is warm and dry.   Neurological:      General: No focal deficit present.      Mental Status: He is alert.   Psychiatric:         Mood and Affect: Mood normal.         Behavior: Behavior normal.         ASSESSMENT & PLAN    Diagnoses and all orders for this visit:  Fatty liver  -     Protime-INR; Future  Essential hypertension  Class 1 obesity due to

## 2025-02-12 ENCOUNTER — OFFICE VISIT (OUTPATIENT)
Dept: ORTHOPEDIC SURGERY | Age: 65
End: 2025-02-12

## 2025-02-12 VITALS — OXYGEN SATURATION: 98 % | HEART RATE: 58 BPM

## 2025-02-12 DIAGNOSIS — Z98.890 S/P CARPAL TUNNEL RELEASE: Primary | ICD-10-CM

## 2025-02-12 PROCEDURE — 99024 POSTOP FOLLOW-UP VISIT: CPT | Performed by: STUDENT IN AN ORGANIZED HEALTH CARE EDUCATION/TRAINING PROGRAM

## 2025-02-12 RX ORDER — GABAPENTIN 100 MG/1
300 CAPSULE ORAL 3 TIMES DAILY
COMMUNITY

## 2025-02-12 ASSESSMENT — ENCOUNTER SYMPTOMS
WHEEZING: 0
SORE THROAT: 0
SHORTNESS OF BREATH: 0
ABDOMINAL PAIN: 0
NAUSEA: 0

## 2025-02-12 NOTE — PROGRESS NOTES
Date of surgery: 12-     Procedure:  Right carpal tunnel release      History:  Pilo is here in follow up regarding their 6-week postop appointment for above procedure    Patient is doing well. They have resolved 0/10 pain. They deny chest pain, SOB, calf pain,fever,wound drainage.  He does state that the thumb numbness has had resolved but the numbness in the second and third fingers have resolved.  He does feel that the thumb numbness is slightly better than what it was before surgery.  No other issues.  Patient denies any constitutional symptoms.    Patient states they have been compliant with restrictions.    No painful events or injuries since last being seen.     Physical:   Patient demonstrates appropriate mood and affect.     Right upper extremity:  The incision is well-healed and is clean, dry, intact, and nontender with no erythema. They have no edema, the Arm compartments are soft . There are No cords or calf tenderness.  No significant calf/ankle edema. They are neurovascularly intact distally.     Range of motion of the hand and wrist demonstrate full active and passive range of motion.     No areas of tenderness to palpation    Imaging:   No new orthopedic imaging     Impression: Status post above, doing well        Plan:   -Reassurance provided patient he is doing excellent today  -No restrictions at this time.  Patient can return to activities as tolerated  -rest/elevation as needed  -No refills needed  -f/u as needed for the carpal tunnel  -f/u sooner prn any issues

## 2025-02-13 PROBLEM — Z98.890 S/P CARPAL TUNNEL RELEASE: Status: ACTIVE | Noted: 2025-02-13

## 2025-06-12 ENCOUNTER — TELEPHONE (OUTPATIENT)
Dept: ONCOLOGY | Age: 65
End: 2025-06-12

## 2025-06-12 NOTE — TELEPHONE ENCOUNTER
The pt sent a NP Photonic[employer] message today wanting to cancel their appt on 6/19/25. I tried to call the pt to see if they wanted to r/s but they did not answer so I left a voicemail on 6/12/25.

## 2025-06-17 LAB
ALBUMIN: 4.6 G/DL
ALP BLD-CCNC: 104 U/L
ALT SERPL-CCNC: 24 U/L
ANION GAP SERPL CALCULATED.3IONS-SCNC: NORMAL MMOL/L
AST SERPL-CCNC: 18 U/L
BASOPHILS ABSOLUTE: 0 /ΜL
BASOPHILS RELATIVE PERCENT: 0 %
BILIRUB SERPL-MCNC: 0.5 MG/DL (ref 0.1–1.4)
BUN BLDV-MCNC: 14 MG/DL
CALCIUM SERPL-MCNC: 9.7 MG/DL
CHLORIDE BLD-SCNC: 102 MMOL/L
CHOLESTEROL, TOTAL: 170 MG/DL
CHOLESTEROL/HDL RATIO: NORMAL
CO2: 21 MMOL/L
CREAT SERPL-MCNC: 0.82 MG/DL
EOSINOPHILS ABSOLUTE: 0 /ΜL
EOSINOPHILS RELATIVE PERCENT: 0 %
ESTIMATED AVERAGE GLUCOSE: NORMAL
GFR, ESTIMATED: 99
GLUCOSE BLD-MCNC: 98 MG/DL
HBA1C MFR BLD: 5.6 %
HCT VFR BLD CALC: 45.5 % (ref 41–53)
HDLC SERPL-MCNC: 46 MG/DL (ref 35–70)
HEMOGLOBIN: 14.4 G/DL (ref 13.5–17.5)
LDL CHOLESTEROL: NORMAL
LYMPHOCYTES ABSOLUTE: 1.3 /ΜL
LYMPHOCYTES RELATIVE PERCENT: 24 %
MCH RBC QN AUTO: 26 PG
MCHC RBC AUTO-ENTMCNC: 31.6 G/DL
MCV RBC AUTO: 82 FL
MONOCYTES ABSOLUTE: 0.4 /ΜL
MONOCYTES RELATIVE PERCENT: 8 %
NEUTROPHILS ABSOLUTE: 3.6 /ΜL
NEUTROPHILS RELATIVE PERCENT: 68 %
NONHDLC SERPL-MCNC: NORMAL MG/DL
PDW BLD-RTO: 14.3 %
PLATELET # BLD: 115 K/ΜL
PMV BLD AUTO: NORMAL FL
POTASSIUM SERPL-SCNC: 4 MMOL/L
PROSTATE SPECIFIC ANTIGEN: 3.8 NG/ML
RBC # BLD: 5.54 10^6/ΜL
SODIUM BLD-SCNC: 138 MMOL/L
TOTAL PROTEIN: 7.7 G/DL (ref 6.4–8.2)
TRIGL SERPL-MCNC: 99 MG/DL
TSH SERPL DL<=0.05 MIU/L-ACNC: 1.28 UIU/ML
VITAMIN B-12: 507
VITAMIN D 25-HYDROXY: 35.2
VITAMIN D2, 25 HYDROXY: NORMAL
VITAMIN D3,25 HYDROXY: NORMAL
VLDLC SERPL CALC-MCNC: 18 MG/DL
WBC # BLD: 5.4 10^3/ML

## 2025-06-19 ENCOUNTER — HOSPITAL ENCOUNTER (OUTPATIENT)
Dept: INFUSION THERAPY | Age: 65
Discharge: HOME | End: 2025-06-19
Payer: COMMERCIAL

## 2025-06-19 ENCOUNTER — OFFICE VISIT (OUTPATIENT)
Dept: ONCOLOGY | Age: 65
End: 2025-06-19
Payer: COMMERCIAL

## 2025-06-19 VITALS
HEART RATE: 78 BPM | DIASTOLIC BLOOD PRESSURE: 89 MMHG | SYSTOLIC BLOOD PRESSURE: 139 MMHG | BODY MASS INDEX: 33.33 KG/M2 | HEIGHT: 70 IN | TEMPERATURE: 97.8 F | WEIGHT: 232.8 LBS | OXYGEN SATURATION: 98 %

## 2025-06-19 DIAGNOSIS — D69.6 THROMBOCYTOPENIA: Primary | ICD-10-CM

## 2025-06-19 PROCEDURE — 99213 OFFICE O/P EST LOW 20 MIN: CPT | Performed by: INTERNAL MEDICINE

## 2025-06-19 PROCEDURE — 99212 OFFICE O/P EST SF 10 MIN: CPT

## 2025-06-19 PROCEDURE — 3075F SYST BP GE 130 - 139MM HG: CPT | Performed by: INTERNAL MEDICINE

## 2025-06-19 PROCEDURE — 3079F DIAST BP 80-89 MM HG: CPT | Performed by: INTERNAL MEDICINE

## 2025-06-19 ASSESSMENT — PATIENT HEALTH QUESTIONNAIRE - PHQ9
SUM OF ALL RESPONSES TO PHQ QUESTIONS 1-9: 0
1. LITTLE INTEREST OR PLEASURE IN DOING THINGS: NOT AT ALL
2. FEELING DOWN, DEPRESSED OR HOPELESS: NOT AT ALL
SUM OF ALL RESPONSES TO PHQ QUESTIONS 1-9: 0

## 2025-06-19 NOTE — PROGRESS NOTES
MA/LPN Rooming Questions  Patient: Pilo Seymour  MRN: [id number]    Date: 6/19/2025        1. Do you have any new issues?   no         2. Do you need any refills on medications?    no    3. Have you had any imaging done since your last visit?   no    4. Have you been hospitalized or seen in the emergency room since your last visit here?   no    5. Did the patient have a depression screening completed today? Yes    PHQ-9 Total Score: 0 (6/19/2025  3:07 PM)       PHQ-9 Given to (if applicable):               PHQ-9 Score (if applicable):                     [] Positive     [x]  Negative              Does question #9 need addressed (if applicable)                     [] Yes    []  No               Kerry Madison CMA      
bleeding complication.   6/17/25 WBC 5.4, Hg 14.4, MCV 82, plat 115. CMP wnl.  Recommend to monitor for mucocutaneous bleeding.     2.  Ultrasound of abdomen showed fatty liver.  I recommend to cut back on his alcohol. He has mild splenomegaly.    3. I recommend to keep age-appropriate cancer screening up-to-date.  Reportedly he has stool occult in 2021.  He will FU with PCP and discuss about colon cancer screening.    4. He has moderate aortic stenosis as per ECHO. He follows with cardiologist. He was told that he may need aortic valve replacement in 3 - 4 years.     Return to clinic in 6 months or sooner.  All of his question has been answered for today.     I have discussed the above stated plan with the patient and they verbalized understanding and agreed with the plan.

## 2025-08-07 ENCOUNTER — OFFICE VISIT (OUTPATIENT)
Dept: FAMILY MEDICINE CLINIC | Age: 65
End: 2025-08-07
Payer: COMMERCIAL

## 2025-08-07 VITALS
HEIGHT: 70 IN | DIASTOLIC BLOOD PRESSURE: 85 MMHG | HEART RATE: 82 BPM | WEIGHT: 236.8 LBS | RESPIRATION RATE: 18 BRPM | OXYGEN SATURATION: 99 % | BODY MASS INDEX: 33.9 KG/M2 | SYSTOLIC BLOOD PRESSURE: 127 MMHG

## 2025-08-07 DIAGNOSIS — E66.09 CLASS 1 OBESITY DUE TO EXCESS CALORIES WITHOUT SERIOUS COMORBIDITY WITH BODY MASS INDEX (BMI) OF 32.0 TO 32.9 IN ADULT: Primary | ICD-10-CM

## 2025-08-07 DIAGNOSIS — E66.811 CLASS 1 OBESITY DUE TO EXCESS CALORIES WITHOUT SERIOUS COMORBIDITY WITH BODY MASS INDEX (BMI) OF 32.0 TO 32.9 IN ADULT: Primary | ICD-10-CM

## 2025-08-07 DIAGNOSIS — Z98.890 S/P CARPAL TUNNEL RELEASE: ICD-10-CM

## 2025-08-07 DIAGNOSIS — I10 ESSENTIAL HYPERTENSION: ICD-10-CM

## 2025-08-07 DIAGNOSIS — I35.0 NONRHEUMATIC AORTIC VALVE STENOSIS: ICD-10-CM

## 2025-08-07 DIAGNOSIS — K76.0 FATTY LIVER: ICD-10-CM

## 2025-08-07 PROCEDURE — 3074F SYST BP LT 130 MM HG: CPT | Performed by: STUDENT IN AN ORGANIZED HEALTH CARE EDUCATION/TRAINING PROGRAM

## 2025-08-07 PROCEDURE — 99214 OFFICE O/P EST MOD 30 MIN: CPT | Performed by: STUDENT IN AN ORGANIZED HEALTH CARE EDUCATION/TRAINING PROGRAM

## 2025-08-07 PROCEDURE — 3079F DIAST BP 80-89 MM HG: CPT | Performed by: STUDENT IN AN ORGANIZED HEALTH CARE EDUCATION/TRAINING PROGRAM

## 2025-08-14 ENCOUNTER — OFFICE VISIT (OUTPATIENT)
Dept: CARDIOLOGY CLINIC | Age: 65
End: 2025-08-14
Payer: COMMERCIAL

## 2025-08-14 VITALS
SYSTOLIC BLOOD PRESSURE: 120 MMHG | WEIGHT: 233 LBS | BODY MASS INDEX: 33.36 KG/M2 | HEIGHT: 70 IN | DIASTOLIC BLOOD PRESSURE: 82 MMHG | HEART RATE: 79 BPM

## 2025-08-14 DIAGNOSIS — R01.1 HEART MURMUR: Primary | ICD-10-CM

## 2025-08-14 DIAGNOSIS — E78.5 DYSLIPIDEMIA: ICD-10-CM

## 2025-08-14 DIAGNOSIS — R06.02 SHORTNESS OF BREATH: ICD-10-CM

## 2025-08-14 DIAGNOSIS — I77.810 AORTIC ROOT DILATION: ICD-10-CM

## 2025-08-14 DIAGNOSIS — I35.0 NONRHEUMATIC AORTIC VALVE STENOSIS: ICD-10-CM

## 2025-08-14 DIAGNOSIS — I10 ESSENTIAL HYPERTENSION: ICD-10-CM

## 2025-08-14 DIAGNOSIS — I38 VALVULAR HEART DISEASE: ICD-10-CM

## 2025-08-14 PROCEDURE — 99214 OFFICE O/P EST MOD 30 MIN: CPT | Performed by: INTERNAL MEDICINE

## 2025-08-14 PROCEDURE — 3079F DIAST BP 80-89 MM HG: CPT | Performed by: INTERNAL MEDICINE

## 2025-08-14 PROCEDURE — 93000 ELECTROCARDIOGRAM COMPLETE: CPT | Performed by: INTERNAL MEDICINE

## 2025-08-14 PROCEDURE — 3074F SYST BP LT 130 MM HG: CPT | Performed by: INTERNAL MEDICINE

## 2025-08-15 ASSESSMENT — ENCOUNTER SYMPTOMS
SORE THROAT: 0
ABDOMINAL PAIN: 0
SHORTNESS OF BREATH: 0
WHEEZING: 0
NAUSEA: 0